# Patient Record
Sex: FEMALE | Race: WHITE | NOT HISPANIC OR LATINO | Employment: FULL TIME | URBAN - METROPOLITAN AREA
[De-identification: names, ages, dates, MRNs, and addresses within clinical notes are randomized per-mention and may not be internally consistent; named-entity substitution may affect disease eponyms.]

---

## 2018-10-20 ENCOUNTER — HOSPITAL ENCOUNTER (EMERGENCY)
Facility: CLINIC | Age: 38
Discharge: HOME | End: 2018-10-20
Attending: EMERGENCY MEDICINE | Admitting: EMERGENCY MEDICINE

## 2018-10-20 VITALS
SYSTOLIC BLOOD PRESSURE: 142 MMHG | DIASTOLIC BLOOD PRESSURE: 110 MMHG | HEIGHT: 62 IN | BODY MASS INDEX: 21.16 KG/M2 | RESPIRATION RATE: 18 BRPM | OXYGEN SATURATION: 99 % | HEART RATE: 99 BPM | TEMPERATURE: 98.2 F | WEIGHT: 115 LBS

## 2018-10-20 DIAGNOSIS — R44.0 AUDITORY HALLUCINATIONS: ICD-10-CM

## 2018-10-20 PROCEDURE — 25000132 ZZH RX MED GY IP 250 OP 250 PS 637: Performed by: EMERGENCY MEDICINE

## 2018-10-20 PROCEDURE — 99283 EMERGENCY DEPT VISIT LOW MDM: CPT

## 2018-10-20 RX ORDER — OLANZAPINE 5 MG/1
5 TABLET, ORALLY DISINTEGRATING ORAL ONCE
Status: COMPLETED | OUTPATIENT
Start: 2018-10-20 | End: 2018-10-20

## 2018-10-20 RX ORDER — LORAZEPAM 1 MG/1
1 TABLET ORAL ONCE
Status: COMPLETED | OUTPATIENT
Start: 2018-10-20 | End: 2018-10-20

## 2018-10-20 RX ADMIN — OLANZAPINE 5 MG: 5 TABLET, ORALLY DISINTEGRATING ORAL at 11:51

## 2018-10-20 RX ADMIN — LORAZEPAM 1 MG: 1 TABLET ORAL at 12:17

## 2018-10-20 ASSESSMENT — ENCOUNTER SYMPTOMS
SHORTNESS OF BREATH: 0
NECK PAIN: 0
HALLUCINATIONS: 1
FEVER: 0
BACK PAIN: 0
COLOR CHANGE: 0
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
## 2018-10-20 NOTE — ED AVS SNAPSHOT
[facility] Emergency Department    [address]    [facility] [address]    Phone:  [phone]    Fax:  [phone]                                       Cat Lake   MRN: [id number]    Department:  [facility] Emergency Department   Date of Visit:  10/20/2018           After Visit Summary Signature Page     I have received my discharge instructions, and my questions have been answered. I have discussed any challenges I see with this plan with the nurse or doctor.    ..........................................................................................................................................  Patient/Patient Representative Signature      ..........................................................................................................................................  Patient Representative Print Name and Relationship to Patient    ..................................................               ................................................  Date                                   Time    ..........................................................................................................................................  Reviewed by Signature/Title    ...................................................              ..............................................  Date                                               Time          22EPIC Rev 08/18

## 2018-10-20 NOTE — ED TRIAGE NOTES
38-year-old female presents to the ER with complaints of hallucinations. Pt states she does IV meth and this started two days after after she took the meth. Would like the things she is hearing to go away. She is not wanting to harm herself or others. Things she is hearing is not telling her to harm herself. Pt last used meth this am.

## 2018-10-20 NOTE — ED AVS SNAPSHOT
[facility] Emergency Department    [address]    [facility] [address]    Phone:  [phone]    Fax:  [phone]                                       Cat Lake   MRN: [id number]    Department:  [facility] [place] Emergency Department   Date of Visit:  10/20/2018           Patient Information     Date Of Birth          1980        Your diagnoses for this visit were:     Auditory hallucinations        You were seen by Octavio Jerez MD.      Follow-up Information     Follow up with [facility].    Specialty:  EMERGENCY MEDICINE    Why:  As needed, If symptoms worsen    Contact information:    [address]  [facility] [address] [phone]        Follow up with Clinic, [facility]. Schedule an appointment as soon as possible for a visit in 2 days.    Contact information:    [address] [facility], [facility] [address]  [place] [address]  [phone]          Discharge Instructions       You came to the ER with hallucinations.  Please return if you develop thoughts of hurting herself or others.  Please follow-up with your primary care provider to discuss ongoing hallucinations or resources for quitting meth.  Return for other new or concerning symptoms.    24 Hour Appointment Hotline       To make an appointment at any [facility] clinic, call [phone] ([phone]). If you don't have a family doctor or clinic, we will help you find one. [facility] clinics are conveniently located to serve the needs of you and your family.             Review of your medicines      Our records show that you are taking the medicines listed below. If these are incorrect, please call your family doctor or clinic.        Dose / Directions Last dose taken    FLUoxetine 20 MG tablet   Dose:  20 mg   Quantity:  30 tablet        Take 1 tablet (20 mg) by mouth daily   Refills:  0        hydrOXYzine 25 MG tablet   Commonly known as:  ATARAX   Dose:  25  mg   Quantity:  60 tablet        Take 1 tablet (25 mg) by mouth every 4 hours as needed for itching or anxiety (Hold if patient is sedated)   Refills:  0        NONFORMULARY        Patient has Implanted Birth Control in left arm   Refills:  0                Orders Needing Specimen Collection     None      Pending Results     No orders found from 10/18/2018 to 10/21/2018.            Pending Culture Results     No orders found from 10/18/2018 to 10/21/2018.            Pending Results Instructions     If you had any lab results that were not finalized at the time of your Discharge, you can call the ED Lab Result RN at [phone]. You will be contacted by this team for any positive Lab results or changes in treatment. The nurses are available 7 days a week from 10A to 6:30P.  You can leave a message 24 hours per day and they will return your call.        Test Results From Your Hospital Stay               Clinical Quality Measure: Blood Pressure Screening     Your blood pressure was checked while you were in the emergency department today. The last reading we obtained was  BP: (!) 155/109 . Please read the guidelines below about what these numbers mean and what you should do about them.  If your systolic blood pressure (the top number) is less than 120 and your diastolic blood pressure (the bottom number) is less than 80, then your blood pressure is normal. There is nothing more that you need to do about it.  If your systolic blood pressure (the top number) is 120-139 or your diastolic blood pressure (the bottom number) is 80-89, your blood pressure may be higher than it should be. You should have your blood pressure rechecked within a year by a primary care provider.  If your systolic blood pressure (the top number) is 140 or greater or your diastolic blood pressure (the bottom number) is 90 or greater, you may have high blood pressure. High blood pressure is treatable, but if left untreated over time it can put you  "at risk for heart attack, stroke, or kidney failure. You should have your blood pressure rechecked by a primary care provider within the next 4 weeks.  If your provider in the emergency department today gave you specific instructions to follow-up with your doctor or provider even sooner than that, you should follow that instruction and not wait for up to 4 weeks for your follow-up visit.        Thank you for choosing [facility]       Thank you for choosing [facility] for your care. Our goal is always to provide you with excellent care. Hearing back from our patients is one way we can continue to improve our services. Please take a few minutes to complete the written survey that you may receive in the mail after you visit with us. Thank you!        [employer] Information     [employer] lets you send messages to your doctor, view your test results, renew your prescriptions, schedule appointments and more. To sign up, go to [email or web address] . Click on \"Log in\" on the left side of the screen, which will take you to the Welcome page. Then click on \"Sign up Now\" on the right side of the page.     You will be asked to enter the access code listed below, as well as some personal information. Please follow the directions to create your username and password.     Your access code is: [email or web address]  Expires: 2019  1:21 PM     Your access code will  in 90 days. If you need help or a new code, please call your [facility] clinic or [phone].        Care EveryWhere ID     This is your Care EveryWhere ID. This could be used by other organizations to access your [facility] medical records  [id number]        Equal Access to Services     [facility] HospitalHELEN : Hadii pa quiroga Socolleen, waaxda luqadaha, qaybta kaalmadustin bello . So [facility] [phone].    ATENCIÓN: Si habla español, tiene a larose disposición servicios gratuitos de asistencia lingüística. Llame al [phone].    We comply " with applicable federal civil rights laws and [place] laws. We do not discriminate on the basis of race, color, national origin, age, disability, sex, sexual orientation, or gender identity.            After Visit Summary       This is your record. Keep this with you and show to your community pharmacist(s) and doctor(s) at your next visit.

## 2018-10-20 NOTE — ED PROVIDER NOTES
History     Chief Complaint:  Hallucinations    HPI   Cat Lake is a 38 year old female with a history of mental health problems and methadone abuse who presents to the emergency department today for evaluation of hallucinations. The patient states that she has been having auditory hallucinations for the past few days. She was sober for a year and a half, but last month relapsed and has been using again. She states she injects into her right arm, but has no redness to the area. She states that the voices don't tell her to do anything and that she does no have suicidal or homicidal ideations. She denies any alcohol use. She further denies any fever, neck or back pain, and trouble breathing.     Allergies:  No Known Drug Allergies    Medications:    FLUoxetine 20 MG tablet  hydrOXYzine (ATARAX) 25 MG tablet  NONFORMULARY    Past Medical History:    Deliberate self-cutting   Lumbar contusion   Methadone misuse   MRSA   Postpartum depression     Past Surgical History:    History reviewed. No pertinent surgical history.    Family History:    Depression  Father   Anxiety Disorder Father   Bipolar Disorder Father   Substance Abuse Father      Social History:  The patient was accompanied to the ED by nobody.  Smoking Status: Current Everyday Smoker   Smokeless Tobacco: Never Used  Alcohol Use: Negative   Marital Status:  Single     Review of Systems   Constitutional: Negative for fever.   Respiratory: Negative for shortness of breath.    Musculoskeletal: Negative for back pain and neck pain.   Skin: Negative for color change.   Psychiatric/Behavioral: Positive for hallucinations. Negative for suicidal ideas.     Physical Exam     Patient Vitals for the past 24 hrs:   BP Temp Temp src Pulse Resp SpO2 Height Weight   10/20/18 1315 (!) 142/110 - - - - 99 % - -   10/20/18 1300 (!) 148/105 - - - - 100 % - -   10/20/18 1245 (!) 152/104 - - - - 90 % - -   10/20/18 1230 (!) 146/106 - - - - 100 % - -   10/20/18 1215 (!)  "147/101 - - - - 100 % - -   10/20/18 1045 (!) 155/109 98.2  F (36.8  C) Temporal 99 18 99 % 1.575 m (5' 2\") 52.2 kg (115 lb)      Physical Exam    VS: Reviewed per above  HENT: Mucous membranes moist  EYES: conjunctiva anicteric  CV: Rate as noted, regular rhythm.   RESP: Effort normal. Breath sounds are normal bilaterally.  GI: no tenderness, not distended.  NEURO: Alert, moving all extremities  PSYCH:    Appearance: adequately groomed   Attitude:  cooperative   Eye Contact:  good   Mood:  good   Affect:  Normal range and mood congruent   Speech:  Clear but pressured, coherent   Psychomotor Behavior:  restless   Thought Process:  logical, linear   Thought Content:  no evidence of suicidal ideation or homicidal ideation, + auditory hallucinations present   Insight:  good   Judgment:  intact    Emergency Department Course     Interventions:  1151 Zyprexa 5 mg PO  1217 Ativan 1 mg PO    Emergency Department Course:    1050 Nursing notes and vitals reviewed.    1055 I performed an exam of the patient as documented above.     1205 Patient was rechecked and updated.     1300 Patient was rechecked and updated.    1433 I personally reviewed the results with the patient and answered all related questions prior to discharge.    Impression & Plan      Medical Decision Making:  Cat Lake is a 38 year old female who presents to the emergency department today for evaluation of auditory hallucinations.  Initial vital signs are within normal limits.  She has a history of auditory hallucinations with methamphetamine use.  She recently used methamphetamines and I suspect they are secondary to this.  The hallucinations are not commanding her to do anything and she is not having suicidal ideation or homicidal ideation.  They are more so bothersome to her.  She does have some restlessness and pressured speech but has good insight into her condition and is quite cooperative.  I suspect these hallucinations are secondary to " methamphetamine use.  She has no fever or nuchal rigidity to suggest meningitis or encephalitis.  She is given Zyprexa and Ativan with some improvement.  Family member was able to pick her up.  She will follow-up in clinic next week.  Close return precautions were discussed.    Diagnosis:    ICD-10-CM    1. Auditory hallucinations R44.0      Disposition:   Discharge    Scribe Disclosure:  Chad BEAVER, am serving as a scribe at 11:01 AM on 10/20/2018 to document services personally performed by Octavio Jerez MD based on my observations and the provider's statements to me.     [facility] EMERGENCY DEPARTMENT       Octavio Jerez MD  10/20/18 5400

## 2018-10-20 NOTE — LETTER
October 21, 2018      To Whom It May Concern:      Cat Lake was seen in our Emergency Department today, 10/21/18.  I expect her condition to improve over the next two days.  She may return to work/school when improved.    Sincerely,        Jalyn Saldivar RN

## 2018-10-20 NOTE — DISCHARGE INSTRUCTIONS
You came to the ER with hallucinations.  Please return if you develop thoughts of hurting herself or others.  Please follow-up with your primary care provider to discuss ongoing hallucinations or resources for quitting meth.  Return for other new or concerning symptoms.

## 2019-07-10 ENCOUNTER — HOSPITAL ENCOUNTER (EMERGENCY)
Facility: CLINIC | Age: 39
Discharge: HOME | End: 2019-07-10
Attending: EMERGENCY MEDICINE | Admitting: EMERGENCY MEDICINE

## 2019-07-10 VITALS
RESPIRATION RATE: 18 BRPM | HEART RATE: 83 BPM | SYSTOLIC BLOOD PRESSURE: 146 MMHG | TEMPERATURE: 98.5 F | DIASTOLIC BLOOD PRESSURE: 111 MMHG | OXYGEN SATURATION: 99 %

## 2019-07-10 DIAGNOSIS — F15.10 METHAMPHETAMINE ABUSE (H): ICD-10-CM

## 2019-07-10 DIAGNOSIS — L08.9 SKIN PUSTULE: ICD-10-CM

## 2019-07-10 DIAGNOSIS — Z59.00 HOMELESSNESS: ICD-10-CM

## 2019-07-10 LAB
ALBUMIN UR-MCNC: 10 MG/DL
AMPHETAMINES UR QL SCN: POSITIVE
APPEARANCE UR: ABNORMAL
BACTERIA #/AREA URNS HPF: ABNORMAL /HPF
BARBITURATES UR QL: NEGATIVE
BENZODIAZ UR QL: NEGATIVE
BILIRUB UR QL STRIP: NEGATIVE
CANNABINOIDS UR QL SCN: NEGATIVE
COCAINE UR QL: NEGATIVE
COLOR UR AUTO: YELLOW
GLUCOSE UR STRIP-MCNC: NEGATIVE MG/DL
HCG UR QL: NEGATIVE
HGB UR QL STRIP: NEGATIVE
KETONES UR STRIP-MCNC: NEGATIVE MG/DL
LEUKOCYTE ESTERASE UR QL STRIP: ABNORMAL
MUCOUS THREADS #/AREA URNS LPF: PRESENT /LPF
NITRATE UR QL: NEGATIVE
OPIATES UR QL SCN: NEGATIVE
PCP UR QL SCN: NEGATIVE
PH UR STRIP: 5.5 PH (ref 5–7)
RBC #/AREA URNS AUTO: 1 /HPF (ref 0–2)
SOURCE: ABNORMAL
SP GR UR STRIP: 1.03 (ref 1–1.03)
SQUAMOUS #/AREA URNS AUTO: 13 /HPF (ref 0–1)
UROBILINOGEN UR STRIP-MCNC: NORMAL MG/DL (ref 0–2)
WBC #/AREA URNS AUTO: 6 /HPF (ref 0–5)

## 2019-07-10 PROCEDURE — 80307 DRUG TEST PRSMV CHEM ANLYZR: CPT | Performed by: EMERGENCY MEDICINE

## 2019-07-10 PROCEDURE — 99285 EMERGENCY DEPT VISIT HI MDM: CPT | Mod: 25

## 2019-07-10 PROCEDURE — 90791 PSYCH DIAGNOSTIC EVALUATION: CPT

## 2019-07-10 PROCEDURE — 81001 URINALYSIS AUTO W/SCOPE: CPT | Performed by: EMERGENCY MEDICINE

## 2019-07-10 PROCEDURE — 81025 URINE PREGNANCY TEST: CPT | Performed by: EMERGENCY MEDICINE

## 2019-07-10 PROCEDURE — 99283 EMERGENCY DEPT VISIT LOW MDM: CPT

## 2019-07-10 SDOH — ECONOMIC STABILITY - HOUSING INSECURITY: HOMELESSNESS UNSPECIFIED: Z59.00

## 2019-07-10 ASSESSMENT — ENCOUNTER SYMPTOMS
DIARRHEA: 0
DYSURIA: 0
HEADACHES: 0
CONSTIPATION: 0
SHORTNESS OF BREATH: 0
FATIGUE: 0
NUMBNESS: 1
BACK PAIN: 1
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
## 2019-07-10 NOTE — ED AVS SNAPSHOT
[facility] Emergency Department  [address]  [facility] [address]  Phone:  [phone]  Fax:  [phone]                                    Cat Lake   MRN: [id number]    Department:  [facility] Emergency Department   Date of Visit:  7/10/2019           After Visit Summary Signature Page    I have received my discharge instructions, and my questions have been answered. I have discussed any challenges I see with this plan with the nurse or doctor.    ..........................................................................................................................................  Patient/Patient Representative Signature      ..........................................................................................................................................  Patient Representative Print Name and Relationship to Patient    ..................................................               ................................................  Date                                   Time    ..........................................................................................................................................  Reviewed by Signature/Title    ...................................................              ..............................................  Date                                               Time          22EPIC Rev 08/18
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
## 2019-08-18 ENCOUNTER — HOSPITAL ENCOUNTER (EMERGENCY)
Facility: CLINIC | Age: 39
Discharge: HOME | End: 2019-08-18

## 2019-08-18 VITALS
RESPIRATION RATE: 16 BRPM | OXYGEN SATURATION: 100 % | HEART RATE: 93 BPM | WEIGHT: 132 LBS | BODY MASS INDEX: 24.14 KG/M2 | TEMPERATURE: 97 F

## 2019-08-19 ENCOUNTER — HOSPITAL ENCOUNTER (EMERGENCY)
Facility: CLINIC | Age: 39
End: 2019-08-19

## 2019-08-19 NOTE — ED TRIAGE NOTES
Mother/brother reports pt does meth and has been hallucinating, seeing spiders in her car and wants detox for her.  Pt denies mental health problems, states that mother does not want her in her house and she is homeless. Requesting for shelter information and left the ED before writer could talk to her. Brother stated that pt was going to smoke but she never came back.

## 2019-09-04 ENCOUNTER — HOSPITAL ENCOUNTER (EMERGENCY)
Facility: CLINIC | Age: 39
Discharge: HOME | End: 2019-09-05
Attending: EMERGENCY MEDICINE | Admitting: EMERGENCY MEDICINE
Payer: MEDICAID

## 2019-09-04 DIAGNOSIS — R10.84 ABDOMINAL PAIN, GENERALIZED: ICD-10-CM

## 2019-09-04 LAB
BASOPHILS # BLD AUTO: 0.1 10E9/L (ref 0–0.2)
BASOPHILS NFR BLD AUTO: 0.6 %
DIFFERENTIAL METHOD BLD: NORMAL
EOSINOPHIL # BLD AUTO: 0.2 10E9/L (ref 0–0.7)
EOSINOPHIL NFR BLD AUTO: 2.7 %
ERYTHROCYTE [DISTWIDTH] IN BLOOD BY AUTOMATED COUNT: 14.5 % (ref 10–15)
HCT VFR BLD AUTO: 40.5 % (ref 35–47)
HGB BLD-MCNC: 12.8 G/DL (ref 11.7–15.7)
IMM GRANULOCYTES # BLD: 0 10E9/L (ref 0–0.4)
IMM GRANULOCYTES NFR BLD: 0.4 %
LYMPHOCYTES # BLD AUTO: 2.1 10E9/L (ref 0.8–5.3)
LYMPHOCYTES NFR BLD AUTO: 25.8 %
MCH RBC QN AUTO: 28.1 PG (ref 26.5–33)
MCHC RBC AUTO-ENTMCNC: 31.6 G/DL (ref 31.5–36.5)
MCV RBC AUTO: 89 FL (ref 78–100)
MONOCYTES # BLD AUTO: 0.7 10E9/L (ref 0–1.3)
MONOCYTES NFR BLD AUTO: 8.6 %
NEUTROPHILS # BLD AUTO: 5 10E9/L (ref 1.6–8.3)
NEUTROPHILS NFR BLD AUTO: 61.9 %
NRBC # BLD AUTO: 0 10*3/UL
NRBC BLD AUTO-RTO: 0 /100
PLATELET # BLD AUTO: 262 10E9/L (ref 150–450)
RBC # BLD AUTO: 4.55 10E12/L (ref 3.8–5.2)
WBC # BLD AUTO: 8.1 10E9/L (ref 4–11)

## 2019-09-04 PROCEDURE — 80053 COMPREHEN METABOLIC PANEL: CPT | Performed by: EMERGENCY MEDICINE

## 2019-09-04 PROCEDURE — 25000132 ZZH RX MED GY IP 250 OP 250 PS 637: Performed by: EMERGENCY MEDICINE

## 2019-09-04 PROCEDURE — 99285 EMERGENCY DEPT VISIT HI MDM: CPT | Mod: 25

## 2019-09-04 PROCEDURE — 25000125 ZZHC RX 250: Performed by: EMERGENCY MEDICINE

## 2019-09-04 PROCEDURE — 84703 CHORIONIC GONADOTROPIN ASSAY: CPT | Performed by: EMERGENCY MEDICINE

## 2019-09-04 PROCEDURE — 85025 COMPLETE CBC W/AUTO DIFF WBC: CPT | Performed by: EMERGENCY MEDICINE

## 2019-09-04 PROCEDURE — 83690 ASSAY OF LIPASE: CPT | Performed by: EMERGENCY MEDICINE

## 2019-09-04 PROCEDURE — 96374 THER/PROPH/DIAG INJ IV PUSH: CPT

## 2019-09-04 RX ADMIN — LIDOCAINE HYDROCHLORIDE 30 ML: 20 SOLUTION ORAL; TOPICAL at 23:41

## 2019-09-04 RX ADMIN — FAMOTIDINE 20 MG: 10 INJECTION INTRAVENOUS at 23:40

## 2019-09-04 ASSESSMENT — ENCOUNTER SYMPTOMS
DIARRHEA: 0
ABDOMINAL PAIN: 1
ABDOMINAL DISTENTION: 1
CONSTIPATION: 0
APPETITE CHANGE: 1

## 2019-09-04 NOTE — ED AVS SNAPSHOT
[facility] Emergency Department  [address]  [facility] [address]  Phone:  [phone]  Fax:  [phone]                                    Cat Lake   MRN: [id number]    Department:  [facility] Emergency Department   Date of Visit:  9/4/2019           After Visit Summary Signature Page    I have received my discharge instructions, and my questions have been answered. I have discussed any challenges I see with this plan with the nurse or doctor.    ..........................................................................................................................................  Patient/Patient Representative Signature      ..........................................................................................................................................  Patient Representative Print Name and Relationship to Patient    ..................................................               ................................................  Date                                   Time    ..........................................................................................................................................  Reviewed by Signature/Title    ...................................................              ..............................................  Date                                               Time          22EPIC Rev 08/18

## 2019-09-05 ENCOUNTER — APPOINTMENT (OUTPATIENT)
Dept: ULTRASOUND IMAGING | Facility: CLINIC | Age: 39
End: 2019-09-05
Attending: EMERGENCY MEDICINE
Payer: MEDICAID

## 2019-09-05 VITALS
HEART RATE: 67 BPM | BODY MASS INDEX: 25.61 KG/M2 | SYSTOLIC BLOOD PRESSURE: 115 MMHG | OXYGEN SATURATION: 98 % | TEMPERATURE: 98.1 F | DIASTOLIC BLOOD PRESSURE: 70 MMHG | WEIGHT: 140 LBS | RESPIRATION RATE: 16 BRPM

## 2019-09-05 LAB
ALBUMIN SERPL-MCNC: 3.9 G/DL (ref 3.4–5)
ALBUMIN UR-MCNC: 20 MG/DL
ALP SERPL-CCNC: 70 U/L (ref 40–150)
ALT SERPL W P-5'-P-CCNC: 19 U/L (ref 0–50)
ANION GAP SERPL CALCULATED.3IONS-SCNC: 3 MMOL/L (ref 3–14)
APPEARANCE UR: CLEAR
AST SERPL W P-5'-P-CCNC: 20 U/L (ref 0–45)
BACTERIA #/AREA URNS HPF: ABNORMAL /HPF
BILIRUB SERPL-MCNC: 0.3 MG/DL (ref 0.2–1.3)
BILIRUB UR QL STRIP: NEGATIVE
BUN SERPL-MCNC: 13 MG/DL (ref 7–30)
CALCIUM SERPL-MCNC: 8.9 MG/DL (ref 8.5–10.1)
CHLORIDE SERPL-SCNC: 107 MMOL/L (ref 94–109)
CO2 SERPL-SCNC: 28 MMOL/L (ref 20–32)
COLOR UR AUTO: YELLOW
CREAT SERPL-MCNC: 0.83 MG/DL (ref 0.52–1.04)
GFR SERPL CREATININE-BSD FRML MDRD: 88 ML/MIN/{1.73_M2}
GLUCOSE SERPL-MCNC: 104 MG/DL (ref 70–99)
GLUCOSE UR STRIP-MCNC: NEGATIVE MG/DL
HCG SERPL QL: NEGATIVE
HGB UR QL STRIP: NEGATIVE
KETONES UR STRIP-MCNC: ABNORMAL MG/DL
LEUKOCYTE ESTERASE UR QL STRIP: NEGATIVE
LIPASE SERPL-CCNC: 148 U/L (ref 73–393)
MUCOUS THREADS #/AREA URNS LPF: PRESENT /LPF
NITRATE UR QL: NEGATIVE
PH UR STRIP: 5.5 PH (ref 5–7)
POTASSIUM SERPL-SCNC: 3.2 MMOL/L (ref 3.4–5.3)
PROT SERPL-MCNC: 7.2 G/DL (ref 6.8–8.8)
RBC #/AREA URNS AUTO: 3 /HPF (ref 0–2)
SODIUM SERPL-SCNC: 138 MMOL/L (ref 133–144)
SOURCE: ABNORMAL
SP GR UR STRIP: 1.03 (ref 1–1.03)
SQUAMOUS #/AREA URNS AUTO: 5 /HPF (ref 0–1)
UROBILINOGEN UR STRIP-MCNC: NORMAL MG/DL (ref 0–2)
WBC #/AREA URNS AUTO: 4 /HPF (ref 0–5)

## 2019-09-05 PROCEDURE — 81001 URINALYSIS AUTO W/SCOPE: CPT | Performed by: EMERGENCY MEDICINE

## 2019-09-05 PROCEDURE — 93976 VASCULAR STUDY: CPT
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
## 2019-09-05 NOTE — ED NOTES
Patient ambulated to the restroom to give UA sample. Patient stable on feet, when back to room patient started eating her crackers and drinking juice. No further complaints of abdomen pain

## 2019-10-22 ENCOUNTER — HOSPITAL ENCOUNTER (EMERGENCY)
Facility: CLINIC | Age: 39
Discharge: HOME | End: 2019-10-23
Attending: EMERGENCY MEDICINE | Admitting: EMERGENCY MEDICINE
Payer: MEDICAID

## 2019-10-22 DIAGNOSIS — R51.9 NONINTRACTABLE HEADACHE, UNSPECIFIED CHRONICITY PATTERN, UNSPECIFIED HEADACHE TYPE: ICD-10-CM

## 2019-10-22 PROCEDURE — 99285 EMERGENCY DEPT VISIT HI MDM: CPT | Mod: 25

## 2019-10-22 RX ORDER — SODIUM CHLORIDE 9 MG/ML
1000 INJECTION, SOLUTION INTRAVENOUS CONTINUOUS
Status: DISCONTINUED | OUTPATIENT
Start: 2019-10-22 | End: 2019-10-23 | Stop reason: HOSPADM

## 2019-10-22 RX ORDER — LORAZEPAM 2 MG/ML
0.5 INJECTION INTRAMUSCULAR ONCE
Status: COMPLETED | OUTPATIENT
Start: 2019-10-22 | End: 2019-10-23

## 2019-10-22 RX ORDER — ONDANSETRON 2 MG/ML
4 INJECTION INTRAMUSCULAR; INTRAVENOUS ONCE
Status: COMPLETED | OUTPATIENT
Start: 2019-10-22 | End: 2019-10-23

## 2019-10-22 ASSESSMENT — ENCOUNTER SYMPTOMS
NAUSEA: 0
VOMITING: 0
NECK PAIN: 0
HEADACHES: 1
FEVER: 0

## 2019-10-22 NOTE — ED AVS SNAPSHOT
[facility] Emergency Department  [address]  [facility] [address]  Phone:  [phone]  Fax:  [phone]                                    Cat Lake   MRN: [id number]    Department:  [facility] Emergency Department   Date of Visit:  10/22/2019           After Visit Summary Signature Page    I have received my discharge instructions, and my questions have been answered. I have discussed any challenges I see with this plan with the nurse or doctor.    ..........................................................................................................................................  Patient/Patient Representative Signature      ..........................................................................................................................................  Patient Representative Print Name and Relationship to Patient    ..................................................               ................................................  Date                                   Time    ..........................................................................................................................................  Reviewed by Signature/Title    ...................................................              ..............................................  Date                                               Time          22EPIC Rev 08/18

## 2019-10-23 ENCOUNTER — APPOINTMENT (OUTPATIENT)
Dept: CT IMAGING | Facility: CLINIC | Age: 39
End: 2019-10-23
Attending: EMERGENCY MEDICINE
Payer: MEDICAID

## 2019-10-23 VITALS
HEART RATE: 76 BPM | DIASTOLIC BLOOD PRESSURE: 83 MMHG | TEMPERATURE: 98 F | BODY MASS INDEX: 23.78 KG/M2 | SYSTOLIC BLOOD PRESSURE: 128 MMHG | WEIGHT: 130 LBS | RESPIRATION RATE: 18 BRPM | OXYGEN SATURATION: 99 %

## 2019-10-23 PROCEDURE — 25000128 H RX IP 250 OP 636: Performed by: EMERGENCY MEDICINE

## 2019-10-23 PROCEDURE — 96375 TX/PRO/DX INJ NEW DRUG ADDON: CPT

## 2019-10-23 PROCEDURE — 96374 THER/PROPH/DIAG INJ IV PUSH: CPT

## 2019-10-23 PROCEDURE — 25800030 ZZH RX IP 258 OP 636: Performed by: EMERGENCY MEDICINE

## 2019-10-23 PROCEDURE — 70450 CT HEAD/BRAIN W/O DYE: CPT

## 2019-10-23 PROCEDURE — 96361 HYDRATE IV INFUSION ADD-ON: CPT

## 2019-10-23 RX ADMIN — LORAZEPAM 0.5 MG: 2 INJECTION INTRAMUSCULAR; INTRAVENOUS at 00:04

## 2019-10-23 RX ADMIN — ONDANSETRON HYDROCHLORIDE 4 MG: 2 INJECTION, SOLUTION INTRAMUSCULAR; INTRAVENOUS at 00:03

## 2019-10-23 RX ADMIN — SODIUM CHLORIDE 1000 ML: 9 INJECTION, SOLUTION INTRAVENOUS at 00:03

## 2019-10-23 NOTE — ED PROVIDER NOTES
History     Chief Complaint:  Headache     HPI   Cat Lake is a 39 year old female, with a history of alcohol abuse, opioid dependency, methadone abuse, overdose, and methamphetamine abuse, who presents to the ED for evaluation of a headache. The patient states that she injected what she thought was methamphetamine about one hour prior to presentation, and states that she immediately developed a left sided headache. She also reports seeing black spots in her left eye. The patient states that this is not as bad as her migraines, but she is concerned that her headache is only one-sided as her migraines are normally at the back of her head. The patient denies any nausea, vomiting, fevers, neck pain, hitting her head, or chance of pregnancy. The patient denies taking any pain medications.     Allergies:  The patient has no known drug allergies.    Medications:    The patient is not currently on any daily prescription medications.    Past Medical History:    Alcohol abuse  Carpal tunnel syndrome  Deliberate self-cutting  Postpartum depression  Lumbar contusion  Methadone misuse  Methamphetamine abuse  MRSA  Opioid dependence  Overdose  Altered mental status   Chemical dependency     Past Surgical History:    Colposcopy, biopsy, combined  Laparoscopic salpingectomy bilateral nos  Mouth surgery  ORIF hand, right    Family History:    Depression, father  Anxiety, father  Bipolar disorder, father  Substance abuse, father    Social History:  Current every day smoker, 1 pack per day.  Negative for alcohol use.  Methamphetamine abuse.  Marital Status:  Single [1]     Review of Systems   Constitutional: Negative for fever.   Eyes: Positive for visual disturbance.   Gastrointestinal: Negative for nausea and vomiting.   Musculoskeletal: Negative for neck pain.   Neurological: Positive for headaches.   All other systems reviewed and are negative.    Physical Exam     Patient Vitals for the past 24 hrs:   BP Temp Temp src  Pulse Heart Rate Resp SpO2 Weight   10/23/19 0036 (!) 138/99 -- -- 92 92 16 100 % --   10/22/19 2332 (!) 163/105 98  F (36.7  C) Temporal 95 95 18 99 % 59 kg (130 lb)     Physical Exam  General: Patient is alert and interactive when I enter the room  Head:  The scalp, face, and head appear normal  Eyes:  Conjunctivae are normal  ENT:    The nose is normal    Pinnae are normal    External acoustic canals are normal  Neck:  Trachea midline, good ROM  CV:  Pulses are normal .    Resp:  No respiratory distress   Abdomen:      Soft, non-tender, non-distended  Musc:  Normal muscular tone    No major joint effusions    No asymmetric leg swelling    Good capillary refill noted  Skin:  No rash or lesions noted  Neuro: Mildly pressured speech. Face is symmetric.     Moving all extremities well.   Psych: Awake. Alert.  Normal affect.  Appropriate interactions.    Emergency Department Course   Imaging:  Radiographic findings were communicated with the patient who voiced understanding of the findings.  CT Head without contrast:   No acute intracranial process as per radiology.    Interventions:  0003 NS 1L IV Bolus   Zofran injection 4 mg IV  0004 Ativan injection 0.5 mg IV    Emergency Department Course:  Nursing notes and vitals reviewed. (3450) I performed an exam of the patient as documented above.     IV inserted. Medicine administered as documented above. Blood drawn. This was sent to the lab for further testing, results above.     The patient was sent for a head CT while in the emergency department, findings above.     0156 I rechecked the patient and discussed the results of her workup thus far.     Findings and plan explained to the Patient. Patient discharged home with instructions regarding supportive care, medications, and reasons to return. The importance of close follow-up was reviewed.     Impression & Plan    Medical Decision Making:  Cat Lake is a 38 yo F with a of substance abuse presents with headache.   Patient was concerned that she was given a drug that she injected that was not meth which is what she intended it to be.  She was concerned because she got a headache right away.  Actually was not the worst headache of her life.  We did do a head CT which was unremarkable.  She denies any fevers.  She otherwise appears well.  The patient has signs of meth intoxication as she is concerned about lesions on her scalp and has having pressured speech.  Patient was given fluids Ativan and Zofran.  She then slept and appeared quite improved.  Her head CT was unremarkable.  I do not think there is anything else in terms of work-up for her.  This was likely a tension headache or secondary to drug use.  Patient felt comfortable going home.  Patient discharged.    Diagnosis:    ICD-10-CM   1. Nonintractable headache, unspecified chronicity pattern, unspecified headache type R51       Disposition:  The patient was discharged to home.    Scribe Disclosure:  I, Ariadne Alfred, am serving as a scribe on 10/22/2019 at 11:40 PM to personally document services performed by Lynn Olson MD based on my observations and the provider's statements to me.     Ariadne Alfred  10/22/2019   [facility] EMERGENCY DEPARTMENT       Lynn Olson MD  10/23/19 0546

## 2019-11-13 ENCOUNTER — HOSPITAL ENCOUNTER (EMERGENCY)
Facility: CLINIC | Age: 39
Discharge: HOME | End: 2019-11-13

## 2019-11-13 VITALS
HEIGHT: 62 IN | SYSTOLIC BLOOD PRESSURE: 105 MMHG | WEIGHT: 134.48 LBS | DIASTOLIC BLOOD PRESSURE: 91 MMHG | OXYGEN SATURATION: 100 % | RESPIRATION RATE: 22 BRPM | BODY MASS INDEX: 24.75 KG/M2 | TEMPERATURE: 97.9 F

## 2019-11-13 ASSESSMENT — MIFFLIN-ST. JEOR: SCORE: 1238.25
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
## 2020-01-04 ENCOUNTER — HOSPITAL ENCOUNTER (EMERGENCY)
Facility: CLINIC | Age: 40
Discharge: HOME | End: 2020-01-04
Attending: PHYSICIAN ASSISTANT | Admitting: PHYSICIAN ASSISTANT
Payer: MEDICAID

## 2020-01-04 VITALS
BODY MASS INDEX: 25.27 KG/M2 | TEMPERATURE: 97.7 F | OXYGEN SATURATION: 100 % | HEIGHT: 62 IN | HEART RATE: 90 BPM | WEIGHT: 137.35 LBS | DIASTOLIC BLOOD PRESSURE: 99 MMHG | SYSTOLIC BLOOD PRESSURE: 142 MMHG

## 2020-01-04 DIAGNOSIS — J32.9 SINUSITIS: ICD-10-CM

## 2020-01-04 DIAGNOSIS — R51.9 ACUTE NONINTRACTABLE HEADACHE, UNSPECIFIED HEADACHE TYPE: ICD-10-CM

## 2020-01-04 PROCEDURE — 25000128 H RX IP 250 OP 636: Performed by: PHYSICIAN ASSISTANT

## 2020-01-04 PROCEDURE — 96361 HYDRATE IV INFUSION ADD-ON: CPT

## 2020-01-04 PROCEDURE — 96375 TX/PRO/DX INJ NEW DRUG ADDON: CPT

## 2020-01-04 PROCEDURE — 99284 EMERGENCY DEPT VISIT MOD MDM: CPT | Mod: 25

## 2020-01-04 PROCEDURE — 25800030 ZZH RX IP 258 OP 636: Performed by: PHYSICIAN ASSISTANT

## 2020-01-04 PROCEDURE — 96374 THER/PROPH/DIAG INJ IV PUSH: CPT

## 2020-01-04 PROCEDURE — 25000125 ZZHC RX 250: Performed by: PHYSICIAN ASSISTANT

## 2020-01-04 RX ORDER — OXYMETAZOLINE HYDROCHLORIDE 0.05 G/100ML
2 SPRAY NASAL ONCE
Status: COMPLETED | OUTPATIENT
Start: 2020-01-04 | End: 2020-01-04

## 2020-01-04 RX ORDER — OLANZAPINE 10 MG/2ML
2.5 INJECTION, POWDER, FOR SOLUTION INTRAMUSCULAR DAILY PRN
Status: DISCONTINUED | OUTPATIENT
Start: 2020-01-04 | End: 2020-01-04 | Stop reason: HOSPADM

## 2020-01-04 RX ORDER — KETOROLAC TROMETHAMINE 15 MG/ML
15 INJECTION, SOLUTION INTRAMUSCULAR; INTRAVENOUS ONCE
Status: COMPLETED | OUTPATIENT
Start: 2020-01-04 | End: 2020-01-04

## 2020-01-04 RX ADMIN — KETOROLAC TROMETHAMINE 15 MG: 15 INJECTION, SOLUTION INTRAMUSCULAR; INTRAVENOUS at 15:22

## 2020-01-04 RX ADMIN — OLANZAPINE 2.5 MG: 10 INJECTION, POWDER, FOR SOLUTION INTRAMUSCULAR at 15:13

## 2020-01-04 RX ADMIN — SODIUM CHLORIDE 1000 ML: 9 INJECTION, SOLUTION INTRAVENOUS at 15:22

## 2020-01-04 RX ADMIN — Medication 2 SPRAY: at 15:22

## 2020-01-04 ASSESSMENT — MIFFLIN-ST. JEOR: SCORE: 1251.25

## 2020-01-04 ASSESSMENT — ENCOUNTER SYMPTOMS
SINUS PRESSURE: 1
HEADACHES: 1
SINUS PAIN: 1
SORE THROAT: 0

## 2020-01-04 NOTE — DISCHARGE INSTRUCTIONS
Discharge Instructions  Sinus Infection    You have acute sinusitis, or an infection of the sinuses. The sinuses are the hollow areas within the facial bones that are connected to the nasal opening. The most common cause of acute sinusitis is a virus infection associated with the common cold. Bacterial sinusitis occurs much less commonly, usually as a complication of viral sinusitis. Experts say that most sinusitis is caused by a virus within the first 7-10 days of illness. Antibiotics do nothing to help with virus infections, so most people do not need antibiotics for acute sinusitis.     Generally, every Emergency Department visit should have a follow-up clinic visit with either a primary or a specialty clinic/provider. Please follow-up as instructed by your emergency provider today.    Return to the Emergency Department if:  Your vision changes.  You are confused or have difficulty thinking clearly.  You have swelling around your eye.  You develop a severe headache or neck stiffness.  Your symptoms get worse and you are unable to see your primary provider.      Treatment:  Pain relief -- Non-prescription pain medications, such as Tylenol  (acetaminophen) or Motrin  or Advil  (ibuprofen) are recommended for pain.  Do not use a medicine that you are allergic to, or if your provider has told you not to use it.     Nasal irrigation -- Flushing the nose and sinuses with a saline solution several times per day can help to decrease pain caused by congestion.  Nasal decongestants -- Nasal decongestant sprays, including Afrin  (oxymetazoline) and Walter-Synephrine  (phenylephrine) can be used to temporarily treat congestion. However, these sprays should not be used for more than two to three days due to the risk of rebound congestion (when the nose is congested constantly unless the medication is used repeatedly).  Nasal glucocorticoids -- These are prescription steroids delivered by a nasal spray that can help to reduce  swelling inside the nose, usually within two to three days. These drugs have few side effects and dramatically relieve symptoms in most people.  If you use these in conjunction with Afrin  you will need to use at least 15 minutes prior to the nasal decongestant.    Antibiotic? -- Rarely antibiotics are used along with the above treatments.    If you were given a prescription for medicine here today, be sure to read all of the information (including the package insert) that comes with your prescription.  This will include important information about the medicine, its side effects, and any warnings that you need to know about.  The pharmacist who fills the prescription can provide more information and answer questions you may have about the medicine.  If you have questions or concerns that the pharmacist cannot address, please call or return to the Emergency Department.   Remember that you can always come back to the Emergency Department if you are not able to see your regular provider in the amount of time listed above, if you get any new symptoms, or if there is anything that worries you.    Discharge Instructions  Headache    You were seen today for a headache. Headaches may be caused by many different things such as muscle tension, sinus inflammation, anxiety and stress, having too little sleep, too much alcohol, some medical conditions or injury. You may have a migraine, which is caused by changes in the blood vessels in your head.  At this time your provider does not find that your headache is a sign of anything dangerous or life-threatening.  However, sometimes the signs of serious illness do not show up right away.      Generally, every Emergency Department visit should have a follow-up clinic visit with either a primary or a specialty clinic/provider. Please follow-up as instructed by your emergency provider today.    Return to the Emergency Department if:  You get a new fever of 100.4 F or higher.  Your  headache gets much worse.  You get a stiff neck with your headache.  You get a new headache that is significantly different or worse than headaches you have had before.  You are vomiting (throwing up) and cannot keep food or water down.  You have blurry or double vision or other problems with your eyes.  You have a new weakness on one side of your body.  You have difficulty with balance which is new.  You or your family thinks you are confused.  You have a seizure.    What can I do to help myself?  Pain medications - You may take a pain medication such as Tylenol  (acetaminophen), Advil , Motrin  (ibuprofen) or Aleve  (naproxen).  Take a pain reliever as soon as you notice symptoms.  Starting medications as soon as you start to have symptoms may lessen the amount of pain you have.  Relaxing in a quiet, dark room may help.  Get enough sleep and eat meals regularly.  You may need to watch for certain foods or other things which may trigger your headaches.  Keeping a journal of your headaches and possible triggers may help you and your primary provider to identify things which you should avoid which may be causing your headaches.  If you were given a prescription for medicine here today, be sure to read all of the information (including the package insert) that comes with your prescription.  This will include important information about the medicine, its side effects, and any warnings that you need to know about.  The pharmacist who fills the prescription can provide more information and answer questions you may have about the medicine.  If you have questions or concerns that the pharmacist cannot address, please call or return to the Emergency Department.   Remember that you can always come back to the Emergency Department if you are not able to see your regular provider in the amount of time listed above, if you get any new symptoms, or if there is anything that worries you.

## 2020-01-04 NOTE — ED AVS SNAPSHOT
[facility] Emergency Department  [address]  [facility] [address]  Phone:  [phone]  Fax:  [phone]                                    Cat Lake   MRN: [id number]    Department:  [facility] Emergency Department   Date of Visit:  1/4/2020           After Visit Summary Signature Page    I have received my discharge instructions, and my questions have been answered. I have discussed any challenges I see with this plan with the nurse or doctor.    ..........................................................................................................................................  Patient/Patient Representative Signature      ..........................................................................................................................................  Patient Representative Print Name and Relationship to Patient    ..................................................               ................................................  Date                                   Time    ..........................................................................................................................................  Reviewed by Signature/Title    ...................................................              ..............................................  Date                                               Time          22EPIC Rev 08/18

## 2020-01-04 NOTE — LETTER
January 4, 2020      To Whom It May Concern:      Cat Lake was seen in our Emergency Department today, 01/04/20.  I expect her condition to improve over the next day.  She may return to work when improved.    Sincerely,        IRMA PinaN, RN, PHN, CHARMAINE, CPEN

## 2020-01-04 NOTE — ED PROVIDER NOTES
"History     Chief Complaint:  Sinusitis    HPI  Cat Lake is a 39 year old female with a history of chemical and alcohol dependence who presents to the emergency department today for evaluation of sinus pressure and headache. Over the last few day she has been experiencing sinus pressure, congestion and headache all of which have been progressively worsening. Yesterday she had a sore throat but this has resolved. The patient has been managing her pain with ibuprofen but today this did not provide good relief.       Allergies:  No known drug allergies    Medications:    The patient is not currently taking any prescribed medications.    Past Medical History:    Overdose  Lumbar contusion  Altered mental status  Chemical dependency  Alcohol abuse  Carpal tunnel syndrome  Self injury  Postpartum depression  Lumbar contusion  Methadone misuse  Methamphetamine abuse  MRSA  Opioid dependence     Past Surgical History:    Open reduction internal fixation hand right  Salpingectomy  Mouth surgery    Family History:    Anxiety Disorder   Bipolar Disorder   Depression  Substance Abuse     Social History:  The patient reports that she has been smoking. She has a 10.00 pack-year smoking history. She has never used smokeless tobacco. She reports current drug use. Drug: Methamphetamines. She reports that she does not drink alcohol.   PCP: [facility], [facility]  Marital Status: Single [1]      Review of Systems   HENT: Positive for congestion, sinus pressure and sinus pain. Negative for sore throat.    Neurological: Positive for headaches.   All other systems reviewed and are negative.       Physical Exam     Patient Vitals for the past 24 hrs:   BP Temp Temp src Pulse Heart Rate SpO2 Height Weight   01/04/20 1650 (!) 142/99 -- -- 90 -- 100 % -- --   01/04/20 1420 (!) 152/99 97.7  F (36.5  C) Temporal -- 96 99 % 1.575 m (5' 2\") 62.3 kg (137 lb 5.6 oz)     Physical Exam  Constitutional: well appearing, no acute distress. "   Head: No external signs of trauma noted to head or face.   Eyes: Pupils are equal, round, and reactive to light. Conjunctiva normal. EOMI.  ENT: external ears, canals, and TMs normal bilaterally. Frontal and maxillary sinus tenderness. Nasal mucosa is edematous. MMM. Oropharynx clear and moist without tonsillar enlargement or exudate noted. Normal voice.   Neck: no lymphadenopathy. Non-tender. Normal ROM without nuchal rigidity.   Cardiovascular: Normal rate, regular rhythm, and intact distal pulses.    Respiratory: Effort normal. No respiratory distress. Lungs clear to auscultation bilaterally.   GI: Soft. Non-tender.   Musculoskeletal: No deformities appreciated. Normal ROM. No edema noted.  Neurological: Alert and Oriented x 3. Speech normal. Moves all extremities equally. CN II-XII inact. Coordination normal. Normal strength and sensation of extremities. Gait normal.  Psychiatric: Appropriate mood, affect, and behavior.   Skin: Skin is warm and dry.       Emergency Department Course     Interventions:  1513 Zyprexa 2.5 mg IM  1522 NS 1L IV Bolus  1522 Toradol, 15 mg, IV  1522 Afrin 2 spray nasal    Emergency Department Course:  Past medical records, nursing notes, and vitals reviewed.  1449: I performed an exam of the patient and obtained history, as documented above.     1631: I rechecked the patient.  Findings and plan explained to the Patient. Patient discharged home with instructions regarding supportive care, medications, and reasons to return. The importance of close follow-up was reviewed.     Impression & Plan      Medical Decision Making:  Cat Lake is a 39 year old female who presents to the emergency department today for evaluation of headache and sinus congestion. She admits that her headache does feel typical of previous headaches and migraines, but that she has new sinus pain associated with it. She is afebrile and well appearing here with a normal neurologic exam. The patient has not had any  fever, weakness, numbness, paresthesia, neck stiffness, confusion, or other concerning red flags today. Meningitis, subarachnoid hemorrhage, CNS tumor, and stroke are considered as part of the differential, and considered unlikely. No indication for imaging at this time. The pain has improved with medication interventions. I suspect this her typical headache, but exacerbated by sinusitis. Given the severity of her symptoms, I will plan to treat with antibiotics. We also discussed decongestant use as well. The patient should follow-up with her primary physician within 2-3 days. Advised to take Ibuprofen/tylenol on a scheduled basis for the next 2 days. If the headache continues or the frequency increases, consultation with neurology may be indicated.  Return if increasing pain, fever, vomiting or weakness, or any other concerns.  Take prescribed medications as directed.      Diagnosis:    ICD-10-CM   1. Acute nonintractable headache, unspecified headache type R51   2. Sinusitis J32.9       Disposition:   discharged to home    Discharge Medications:     Medication List      Started    amoxicillin-clavulanate 875-125 MG tablet  Commonly known as:  AUGMENTIN  1 tablet, Oral, 2 TIMES DAILY            Scribe Disclosure:  I, Yue Dixon, am serving as a scribe at 2:49 PM on 1/4/2020 to document services personally performed by Rachel Lombardo PA-C based on my observations and the provider's statements to me.    [facility] EMERGENCY DEPARTMENT       Rachel Lombardo PA-C  01/05/20 1056

## 2020-01-23 ENCOUNTER — HOSPITAL ENCOUNTER (EMERGENCY)
Facility: CLINIC | Age: 40
Discharge: HOME | End: 2020-01-23
Attending: EMERGENCY MEDICINE | Admitting: EMERGENCY MEDICINE
Payer: COMMERCIAL

## 2020-01-23 VITALS
SYSTOLIC BLOOD PRESSURE: 143 MMHG | TEMPERATURE: 97.5 F | OXYGEN SATURATION: 97 % | RESPIRATION RATE: 16 BRPM | HEART RATE: 92 BPM | DIASTOLIC BLOOD PRESSURE: 97 MMHG

## 2020-01-23 DIAGNOSIS — Z04.41 ENCOUNTER FOR EXAMINATION AND OBSERVATION FOLLOWING ALLEGED ADULT RAPE: ICD-10-CM

## 2020-01-23 PROCEDURE — 25000128 H RX IP 250 OP 636: Performed by: EMERGENCY MEDICINE

## 2020-01-23 PROCEDURE — 96372 THER/PROPH/DIAG INJ SC/IM: CPT

## 2020-01-23 PROCEDURE — 25000125 ZZHC RX 250: Performed by: EMERGENCY MEDICINE

## 2020-01-23 PROCEDURE — 99285 EMERGENCY DEPT VISIT HI MDM: CPT | Mod: 25

## 2020-01-23 PROCEDURE — 25000132 ZZH RX MED GY IP 250 OP 250 PS 637: Performed by: EMERGENCY MEDICINE

## 2020-01-23 RX ORDER — AZITHROMYCIN 250 MG/1
1000 TABLET, FILM COATED ORAL ONCE
Status: COMPLETED | OUTPATIENT
Start: 2020-01-23 | End: 2020-01-23

## 2020-01-23 RX ORDER — METRONIDAZOLE 500 MG/1
2000 TABLET ORAL ONCE
Status: COMPLETED | OUTPATIENT
Start: 2020-01-23 | End: 2020-01-23

## 2020-01-23 RX ORDER — EMTRICITABINE AND TENOFOVIR DISOPROXIL FUMARATE 200; 300 MG/1; MG/1
1 TABLET, FILM COATED ORAL ONCE
Status: COMPLETED | OUTPATIENT
Start: 2020-01-23 | End: 2020-01-23

## 2020-01-23 RX ORDER — EMTRICITABINE AND TENOFOVIR DISOPROXIL FUMARATE 200; 300 MG/1; MG/1
1 TABLET, FILM COATED ORAL DAILY
Qty: 28 TABLET | Refills: 0 | Status: SHIPPED | OUTPATIENT
Start: 2020-01-23 | End: 2022-07-06

## 2020-01-23 RX ADMIN — AZITHROMYCIN MONOHYDRATE 1000 MG: 250 TABLET ORAL at 20:57

## 2020-01-23 RX ADMIN — LIDOCAINE HYDROCHLORIDE 250 MG: 10 INJECTION, SOLUTION EPIDURAL; INFILTRATION; INTRACAUDAL; PERINEURAL at 20:57

## 2020-01-23 RX ADMIN — METRONIDAZOLE 2000 MG: 500 TABLET ORAL at 20:57

## 2020-01-23 RX ADMIN — DOLUTEGRAVIR SODIUM 50 MG: 50 TABLET, FILM COATED ORAL at 20:57

## 2020-01-23 RX ADMIN — EMTRICITABINE AND TENOFOVIR DISOPROXIL FUMARATE 1 TABLET: 200; 300 TABLET, FILM COATED ORAL at 20:57

## 2020-01-23 NOTE — ED NOTES
Spoke with SHAH nurse from [facility] who stated a ESTELA nurse would be paged to our facility and would call when they have an ETA.
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
## 2020-01-23 NOTE — ED AVS SNAPSHOT
[facility] Emergency Department  [address]  [facility] [address]  Phone:  [phone]  Fax:  [phone]                                    Cat Lake   MRN: [id number]    Department:  [facility] Emergency Department   Date of Visit:  1/23/2020           After Visit Summary Signature Page    I have received my discharge instructions, and my questions have been answered. I have discussed any challenges I see with this plan with the nurse or doctor.    ..........................................................................................................................................  Patient/Patient Representative Signature      ..........................................................................................................................................  Patient Representative Print Name and Relationship to Patient    ..................................................               ................................................  Date                                   Time    ..........................................................................................................................................  Reviewed by Signature/Title    ...................................................              ..............................................  Date                                               Time          22EPIC Rev 08/18
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
## 2020-01-24 NOTE — ED NOTES
Pt has no complaints or requests at this time, awaiting prophylactic medications, then plan is discharge.
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
## 2020-03-24 ENCOUNTER — HOSPITAL ENCOUNTER (EMERGENCY)
Facility: CLINIC | Age: 40
Discharge: HOME | End: 2020-03-24
Attending: EMERGENCY MEDICINE | Admitting: EMERGENCY MEDICINE
Payer: MEDICAID

## 2020-03-24 VITALS
TEMPERATURE: 97.5 F | SYSTOLIC BLOOD PRESSURE: 160 MMHG | DIASTOLIC BLOOD PRESSURE: 100 MMHG | OXYGEN SATURATION: 100 % | HEART RATE: 86 BPM | RESPIRATION RATE: 20 BRPM

## 2020-03-24 DIAGNOSIS — F32.A DEPRESSION, UNSPECIFIED DEPRESSION TYPE: ICD-10-CM

## 2020-03-24 DIAGNOSIS — F15.10 AMPHETAMINE ABUSE (H): ICD-10-CM

## 2020-03-24 LAB
AMPHETAMINES UR QL SCN: POSITIVE
BARBITURATES UR QL: NEGATIVE
BENZODIAZ UR QL: NEGATIVE
CANNABINOIDS UR QL SCN: NEGATIVE
COCAINE UR QL: NEGATIVE
ETHANOL SERPL-MCNC: <0.01 G/DL
OPIATES UR QL SCN: NEGATIVE
PCP UR QL SCN: NEGATIVE

## 2020-03-24 PROCEDURE — 80307 DRUG TEST PRSMV CHEM ANLYZR: CPT | Performed by: EMERGENCY MEDICINE

## 2020-03-24 PROCEDURE — 80320 DRUG SCREEN QUANTALCOHOLS: CPT | Performed by: EMERGENCY MEDICINE

## 2020-03-24 PROCEDURE — 90791 PSYCH DIAGNOSTIC EVALUATION: CPT

## 2020-03-24 PROCEDURE — 99285 EMERGENCY DEPT VISIT HI MDM: CPT | Mod: 25

## 2020-03-24 PROCEDURE — 36415 COLL VENOUS BLD VENIPUNCTURE: CPT | Performed by: EMERGENCY MEDICINE

## 2020-03-24 NOTE — ED AVS SNAPSHOT
[facility] Emergency Department  [address]  [facility] [address]  Phone:  [phone]  Fax:  [phone]                                    Cat Lake   MRN: [id number]    Department:  [facility] Emergency Department   Date of Visit:  3/24/2020           After Visit Summary Signature Page    I have received my discharge instructions, and my questions have been answered. I have discussed any challenges I see with this plan with the nurse or doctor.    ..........................................................................................................................................  Patient/Patient Representative Signature      ..........................................................................................................................................  Patient Representative Print Name and Relationship to Patient    ..................................................               ................................................  Date                                   Time    ..........................................................................................................................................  Reviewed by Signature/Title    ...................................................              ..............................................  Date                                               Time          22EPIC Rev 08/18

## 2020-03-24 NOTE — ED TRIAGE NOTES
Patient presents with complaints of suicidal thoughts for the past week. Patient denies a plan at this time. ABC intact without need for intervention at this time.
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
## 2021-03-20 ENCOUNTER — HEALTH MAINTENANCE LETTER (OUTPATIENT)
Age: 41
End: 2021-03-20

## 2021-05-05 NOTE — ED NOTES
Patient called twice to be brought back to room. No response each time 1951 and 1959. Was was not seen in lobby as well.   
contact guard

## 2021-10-14 ENCOUNTER — TELEPHONE (OUTPATIENT)
Dept: BEHAVIORAL HEALTH | Facility: CLINIC | Age: 41
End: 2021-10-14

## 2021-10-14 NOTE — TELEPHONE ENCOUNTER
10/14/21 Received call from Pt requesting a Gambling Eval. Scheduled for 11/16/21 Video Visit. OBIE

## 2021-11-16 ENCOUNTER — HOSPITAL ENCOUNTER (OUTPATIENT)
Dept: BEHAVIORAL HEALTH | Facility: CLINIC | Age: 41
Discharge: HOME | End: 2021-11-16
Attending: FAMILY MEDICINE | Admitting: FAMILY MEDICINE
Payer: COMMERCIAL

## 2021-11-16 PROCEDURE — 90791 PSYCH DIAGNOSTIC EVALUATION: CPT | Mod: GT,95

## 2021-11-16 ASSESSMENT — ANXIETY QUESTIONNAIRES
GAD7 TOTAL SCORE: 6
8. IF YOU CHECKED OFF ANY PROBLEMS, HOW DIFFICULT HAVE THESE MADE IT FOR YOU TO DO YOUR WORK, TAKE CARE OF THINGS AT HOME, OR GET ALONG WITH OTHER PEOPLE?: SOMEWHAT DIFFICULT
6. BECOMING EASILY ANNOYED OR IRRITABLE: NOT AT ALL
7. FEELING AFRAID AS IF SOMETHING AWFUL MIGHT HAPPEN: SEVERAL DAYS
2. NOT BEING ABLE TO STOP OR CONTROL WORRYING: SEVERAL DAYS
GAD7 TOTAL SCORE: 6
3. WORRYING TOO MUCH ABOUT DIFFERENT THINGS: SEVERAL DAYS
7. FEELING AFRAID AS IF SOMETHING AWFUL MIGHT HAPPEN: SEVERAL DAYS
GAD7 TOTAL SCORE: 6
1. FEELING NERVOUS, ANXIOUS, OR ON EDGE: SEVERAL DAYS
4. TROUBLE RELAXING: SEVERAL DAYS
5. BEING SO RESTLESS THAT IT IS HARD TO SIT STILL: SEVERAL DAYS

## 2021-11-16 ASSESSMENT — PATIENT HEALTH QUESTIONNAIRE - PHQ9
SUM OF ALL RESPONSES TO PHQ QUESTIONS 1-9: 4
SUM OF ALL RESPONSES TO PHQ QUESTIONS 1-9: 4
10. IF YOU CHECKED OFF ANY PROBLEMS, HOW DIFFICULT HAVE THESE PROBLEMS MADE IT FOR YOU TO DO YOUR WORK, TAKE CARE OF THINGS AT HOME, OR GET ALONG WITH OTHER PEOPLE: SOMEWHAT DIFFICULT

## 2021-11-16 NOTE — TELEPHONE ENCOUNTER
Writer sent Releases of Information to PT via [employer] for:    [employer] - Problem Gambling Mike  Self  [facility] Probation  Pravin Sierra - Emergency Contact

## 2021-11-16 NOTE — TELEPHONE ENCOUNTER
Writer placed a call this morning to check in patient for virtual video appointment at 12:30pm. Unable to get a hold of patient, writer left a voicemail with writer's call back number. If patient does not respond, writer will try to call patient again.

## 2021-11-16 NOTE — PROGRESS NOTES
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 spirituality impact your recovery?    PT identified spirituality as important.    Please list any other problems, issues or concerns that could affect your recovery if not addressed?      Per PT \"Codependency.  I feel like I'm doing a good job identifying and working through these issues\".Codependency.     Dimension VI Ratings   Recovery environment - The placing authority must use the criteria in Dimension VI to determine a client s recovery environment.   RISK DESCRIPTIONS - Severity ratin Client is engaged in structured, meaningful activity and has a supportive significant other, family, and living environment.    REASONS SEVERITY WAS ASSIGNED - (What support does the person have for making changes? What structure/stability does the person have in his or her daily life that will increase the likelihood that changes can be sustained? What problems exist in the person s environment that will jeopardize getting/staying clean and sober?)     The patient reports living independently at the time of assessment.  Patient reports living environment is supportive of recovery efforts.  Patient denies she is currently in a significant " relationship. PT reported working with a sponsor in her recovery and attending 12-step support meetings in the community.  PT reports she is currently employed and appears to engage in meaningful activities outside of employment.  PT does report active legal involvement due to a theft, reporting she is currently following all conditions and recommendations of probation.           Collateral Contacts     Name:    Sonia Arguelles  [facility]   Relationship:       Phone Number:    [phone] Releases:    Yes     11/17/2021  Faxed ASHLEY to Probation.  11/17/2021  Probation reports PT has completed RAKAN treatment and continues to follow recommendations.  Probation reports felony theft charges and due to previous [facility] probation, PT scored a 6 on the SOGS and was recommended to completed a problem gambling evaluation.  Probation denies any current concerns with PT and history of gambling associated with substance use.          Summary of Gambling Disorder Symptoms     PT does not meet criteria for Gambling Disorder at this time.    Specify if:   Episodic:  Meeting diagnostic at more than one time point, with symptoms subsiding between periods of gambling disorder for at least several months.    Persistent:  Experiencing continuous symptoms, to meet diagnostic criteria for multiple years.    Specify if:   In early remission:  After full criteria for alcohol/drug use disorder were previously met, none of the criteria for alcohol/drug use disorder have been met for at least 3 months but for less than 12 months (with the exception that Criterion A4,  Craving or a strong desire or urge to use alcohol/drug  may be met).     In sustained remission:   After full criteria for alcohol use disorder were previously met, none of the criteria for alcohol/drug use disorder have been met at any time during a period of 12 months or longer (with the exception that Criterion A4,  Craving or strong desire or urge  to use alcohol/drug  may be met).   Specify if:   This additional specifier is used if the individual is in an environment where access to alcohol is restricted.    Mild: Presence of 4-5 symptoms    Moderate: Presence of 6-7 symptoms    Severe: Presence of 8 or more symptoms      Summary of Substance Abuse Disorder Symptoms     A problematic pattern of alcohol/drug use leading to clinically significant impairment or distress, as manifested by at least two of the following, occurring within a 12-month period:    PT reports a Substance Use Disorder history, reporting current sustained remission.    Specify if:   In early remission:  After full criteria for alcohol/drug use disorder were previously met, none of the criteria for alcohol/drug use disorder have been met for at least 3 months but for less than 12 months (with the exception that Criterion A4,  Craving or a strong desire or urge to use alcohol/drug  may be met).     In sustained remission:   After full criteria for alcohol use disorder were previously met, none of the criteria for alcohol/drug use disorder have been met at any time during a period of 12 months or longer (with the exception that Criterion A4,  Craving or strong desire or urge to use alcohol/drug  may be met).   Specify if:   This additional specifier is used if the individual is in an environment where access to alcohol is restricted.    Mild: Presence of 2-3 symptoms    Moderate: Presence of 4-5 symptoms    Severe: Presence of 6 or more symptoms    SOGS: 7 DSM-5: 0 CAGE-AID: 0 Current RAKAN sustained remission BRITTANI-7: 6 PHQ-9: 4       Vulnerable Adult Checklist for OUTPATIENTS     1.  Do you have a physical, emotional or mental infirmity or dysfunction?       No    2.  Does this issue impair your ability to provide for your own care without help, including providing yourself with food, shelter, clothing, healthcare or supervision?       No    3.  Because of this issue, I need assistance to  protect myself from maltreatment by others.      No    Based on the above information:    This person is not a functional Vulnerable Adult according to [place] Statute 626.5572 subdivision 21.      Category Severity (ICD-10 Code / DSM 5 Code)   Gambling Disorder NA     [place]-Suicide Severity Rating Scale   Suicide Ideation   1.) Have you ever wished you were dead or that you could go to sleep and not wake up?     Lifetime:  Yes - PT reports suicide attempt in 2012.  PT also reported active ideation without a plan in 2018. Past Month:  No   2.) Have you actually had any thoughts of killing yourself?   Lifetime:  Yes - PT reports an attempt in 2012. Past Month:  No   3.) Have you been thinking about how you might do this?     Lifetime:  Yes, Describe: PT reports taking pills in 2012.  PT reported medical intervention. Past Month:  No   4.) Have you had these thoughts and had some intention of acting on them?     Lifetime:  Yes, Describe: PT reports suicide attempt in 2012. Past Month:  No   5.) Have you started to work out the details of how to kill yourself?   Lifetime:  Yes, Describe: PT reports suicide attempt in 2012. Past Month:  No   6.) Do you intend to carry out this plan?      Lifetime:  Yes, Describe: PT reported suicide attempt in 2012. Past Month:  No   Intensity of Ideation   Intensity of ideation (1 being least severe, 5 being most severe):     Lifetime:  PT reported in 2018 about a 3.  PT denied having a plan a the time.  PT reported ideation in 2012 was a 5 due to attempt. Past Month:  PT denies any ideations in the past month.   How often do you have these thoughts?  PT denies any current suicidal ideations.    When you have the thoughts how long do they last?  N/A   Can you stop thinking about killing yourself or wanting to die if you want to?  N/A   Are there things - anyone or anything (i.e. family, Temple, pain of death) that stopped you from wanting to die or acting on thoughts of suicide?  PT reported seeking medical intervention in 2012 and 2018.  PT denied any other suicidal ideations, plans or attempts.   What sort of reasons did you have for thinking about wanting to die or killing yourself (ie end pain, stop how you were feeling, get attention or reaction, revenge)?  PT reported the suicide attempt in 2012 reporting it was during an argument with her then partner.   Suicidal Behavior   (Suicide Attempt) - Have you made a suicide attempt?     Lifetime:  Yes.  Total number of attempts:  1.  Date of most recent attempt: 2012. Past Month:  No   Have you engaged in self-harm (non-suicidal self-injury)?  Yes, Describe: PT reported cutting in 2012.  PT denied any self-injurious behaviors since that time.   (Interrupted Attempt) - Has there been a time when you started to do something to end your life but someone or something stopped you before you actually did anything?  No   (Aborted or Self-Interrupted Attempt) - Has there been a time when you started to do something to try to end your life but you stopped yourself before you actually did anything?  PT reported having a loaded needle of drugs in 2018 and did not use.     (Preparatory Acts of Behavior) - Have you taken any steps towards making suicide attempt or preparing to kill yourself (such as collecting pills, getting a gun, giving valuables away or writing a suicide note)?  No   Actual Lethality/Medical Damage:  PT reported medical attention in 2012 due to taking pills.      2008  [employer]  Used with permission by Carolyn Ibrahim, PhD.       Guide to C-SSRS Risk Ratings   NO IDEATION:  with no active thoughts IDEATION: with a wish to die. IDEATION: with active thoughts. Risk Ratings   If Yes No No 0 - Very Low Risk   If NA Yes No 1 - Low Risk   If NA Yes Yes 2 - Low/moderate risk   IDEATION: associated thoughts of methods without intent or plan INTENT: Intent to follow through on suicide PLAN: Plan to follow  "through on suicide Risk Ratings cont...   If Yes No No 3 - Moderate Risk   If Yes Yes No 4 - High Risk   If Yes Yes Yes 5 - High Risk   The patient's ADDITIONAL RISK FACTORS and lack of PROTECTIVE FACTORS may increase their overall suicide risk ratings.     Additional Risk Factors:    Someone close to the patient (family member/friend) completed a suicide     Significant history of having untreated or poorly treated mental health symptoms     Tendency to be socially isolated and/or cut off from the support of others     Significant history of trauma and/or abuse issues     History of impulsive or aggressive behaviors     Significant legal problems including being at risk of incarceration   Protective Factors:    Having people in his/her life that would prevent the patient from considering a suicide attempt (i.e. young children, spouse, parents, etc.)     An absence of mental health issues or stable and well treated mental health issues     A positive relationship with his/her clinical medical and/or mental health providers     Having easy access to supportive family members     Having a good community support network     Having cultural, Spiritism or spiritual beliefs that discourage suicide     Having restricted access to highly lethal means of suicide     Risk Status   0. - Very Low Risk:  Evaluation Counselors:  Document in Epic / SBAR to counselor \"Very Low Risk\".      Treatment Counselors:  Reassess upon admission as applicable, assess weekly in progress notes under Dimension 3 and summarize in Discharge / Treatment summary under Dimension 3.   Additional information to support suicide risk rating: There was no additional information to provide at this time.     Summary of [facility] Placement Criteria:   I.) Intoxication and Withdrawal: 0   II.) Biomedical:  0   III.) Emotional and Behavioral:  1   IV.) Readiness to Change:  0   V.) Relapse Potential: 0   VI.) Recovery Environmental: 0     Evaluation Summary and " "Plan     Based on the information provided by the patient and collateral contact, Patient does NOT meet criteria for Gambling Disorder at the time of evaluation.  If any future occurrence should occur regarding financial or gambling, it is recommended patient return to [facility] for an updated evaluation.    's Recommendation      Abstain from all forms of gambling, including \"free\" games and online/joellen games.    Refrain from entering all types of wanda establishments.    Abstain from alcohol and all mood-altering substances unless prescribed by a licensed medical provider.    Continue to meet with established mental health therapist and follow ongoing recommendations.    Attend recovery support groups such as Alcoholics Anonymous, Narcotics Anonymous, Gamblers Anonymous, other 12-step, cultural, spiritual, and/or other supportive community meetings on a weekly basis.    Check in weekly with a trusted individual whom will hold you accountable.    Remain law abiding and follow all conditions/recommendations of probation/the courts.      Strengths:  Family support  Stable Employment  Financially secure  Properly treated mental health concerns  Properly treated physical health concerns  Stable housing  Connection to social and/or recovery support  Intelligent  Functional communication skills      Lori Lomas, JORY, [facility]        "

## 2021-11-17 ASSESSMENT — ANXIETY QUESTIONNAIRES: GAD7 TOTAL SCORE: 6

## 2021-11-17 ASSESSMENT — PATIENT HEALTH QUESTIONNAIRE - PHQ9: SUM OF ALL RESPONSES TO PHQ QUESTIONS 1-9: 4

## 2021-11-19 NOTE — TELEPHONE ENCOUNTER
Writer contacted PT informing her of recommendations following problem gambling evaluation.    Writer left VM for probation regarding recommendations of problem gambling evaluation.

## 2021-12-20 ENCOUNTER — OFFICE VISIT (OUTPATIENT)
Dept: FAMILY MEDICINE | Facility: CLINIC | Age: 41
End: 2021-12-20
Payer: COMMERCIAL

## 2021-12-20 VITALS
WEIGHT: 135 LBS | OXYGEN SATURATION: 98 % | TEMPERATURE: 97.5 F | HEART RATE: 87 BPM | BODY MASS INDEX: 24.69 KG/M2 | DIASTOLIC BLOOD PRESSURE: 77 MMHG | SYSTOLIC BLOOD PRESSURE: 124 MMHG

## 2021-12-20 DIAGNOSIS — N63.20 LEFT BREAST LUMP: Primary | ICD-10-CM

## 2021-12-20 PROCEDURE — 99203 OFFICE O/P NEW LOW 30 MIN: CPT

## 2021-12-20 ASSESSMENT — ENCOUNTER SYMPTOMS
CARDIOVASCULAR NEGATIVE: 1
MUSCULOSKELETAL NEGATIVE: 1
RESPIRATORY NEGATIVE: 1
BREAST MASS: 1
NEUROLOGICAL NEGATIVE: 1
CONSTITUTIONAL NEGATIVE: 1

## 2021-12-20 NOTE — PATIENT INSTRUCTIONS
Alternate Tylenol and Ibuprofen for discomfort. Apply heat to the area. If you notice the return of the lump, please follow up for ultrasound.

## 2021-12-20 NOTE — PROGRESS NOTES
Assessment & Plan     Left breast lump    Alternate Tylenol and Ibuprofen for discomfort. Apply heat to the area. If you notice the return of the lump, please follow up for ultrasound.       Return in about 1 week (around 12/27/2021), or if symptoms worsen or fail to improve.    Subjective     Cat is a 41 year old female who presents to clinic today for the following health issues:  Chief Complaint   Patient presents with     Mass     pinaful lymph node on the upper chest above the left breast      Cat presents with lump on her upper left breast last night. She reports she had nausea last night but besides this, no symptoms. She reports it is painful to touch and mobile. She denies fever, redness, discharge, weight change. It is not currently present.           Review of Systems   Constitutional: Negative.    Respiratory: Negative.    Cardiovascular: Negative.    Breasts:  Positive for breast mass.   Musculoskeletal: Negative.    Neurological: Negative.            Objective    /77 (BP Location: Right arm, Patient Position: Chair, Cuff Size: Adult Regular)   Pulse 87   Temp 97.5  F (36.4  C) (Tympanic)   Wt 61.2 kg (135 lb)   SpO2 98%   BMI 24.69 kg/m    Physical Exam  Constitutional:       Appearance: Normal appearance.   HENT:      Head: Normocephalic and atraumatic.   Musculoskeletal:      Cervical back: Normal range of motion and neck supple.   Skin:     Comments: No palpable mass, no erythema, no discharge noted   Neurological:      General: No focal deficit present.      Mental Status: She is alert and oriented to person, place, and time.   Psychiatric:         Mood and Affect: Mood normal.         Behavior: Behavior normal.         Thought Content: Thought content normal.         Judgment: Judgment normal.              Elfego Campbell PA-C

## 2021-12-31 ENCOUNTER — HOSPITAL ENCOUNTER (EMERGENCY)
Facility: CLINIC | Age: 41
Discharge: HOME | End: 2021-12-31
Admitting: EMERGENCY MEDICINE
Payer: COMMERCIAL

## 2021-12-31 VITALS
TEMPERATURE: 98.4 F | HEART RATE: 79 BPM | RESPIRATION RATE: 20 BRPM | OXYGEN SATURATION: 99 % | SYSTOLIC BLOOD PRESSURE: 145 MMHG | DIASTOLIC BLOOD PRESSURE: 104 MMHG

## 2021-12-31 PROCEDURE — 999N000104 HC STATISTIC NO CHARGE

## 2021-12-31 PROCEDURE — 93005 ELECTROCARDIOGRAM TRACING: CPT

## 2022-01-01 NOTE — ED NOTES
"Pt stated \"Since my EKG was ok I don't think that I am going to die and I will see my regular doctor later.\" I explained to the patient that while her EKG was not concerning enough to prioritize her ahead of other patient's who had also been waiting to see a provider a full evaluation of her condition had not been performed and that we could not medically clear her condition without an evaluation by a provider and likely further diagnostic testing. The patient verbalized understanding and stated that she still wished to LWBS. I presented the patient with a refusal of MSE form which she signed in my presence after verbalizing understanding of the risks of LWBS including worsening of condition and death. The patient the departed the ED.   "

## 2022-01-01 NOTE — ED TRIAGE NOTES
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 ABCs intact GCS 15

## 2022-01-03 ENCOUNTER — OFFICE VISIT (OUTPATIENT)
Dept: FAMILY MEDICINE | Facility: CLINIC | Age: 42
End: 2022-01-03
Payer: COMMERCIAL

## 2022-01-03 VITALS
TEMPERATURE: 97.7 F | OXYGEN SATURATION: 98 % | SYSTOLIC BLOOD PRESSURE: 138 MMHG | BODY MASS INDEX: 24.87 KG/M2 | DIASTOLIC BLOOD PRESSURE: 87 MMHG | HEART RATE: 105 BPM | WEIGHT: 136 LBS

## 2022-01-03 DIAGNOSIS — R05.9 COUGH: Primary | ICD-10-CM

## 2022-01-03 DIAGNOSIS — H60.391 INFECTIVE OTITIS EXTERNA, RIGHT: ICD-10-CM

## 2022-01-03 DIAGNOSIS — Z20.822 SUSPECTED 2019 NOVEL CORONAVIRUS INFECTION: ICD-10-CM

## 2022-01-03 LAB
ATRIAL RATE - MUSE: 73 BPM
DIASTOLIC BLOOD PRESSURE - MUSE: NORMAL MMHG
INTERPRETATION ECG - MUSE: NORMAL
P AXIS - MUSE: 78 DEGREES
PR INTERVAL - MUSE: 138 MS
QRS DURATION - MUSE: 100 MS
QT - MUSE: 392 MS
QTC - MUSE: 431 MS
R AXIS - MUSE: 86 DEGREES
SYSTOLIC BLOOD PRESSURE - MUSE: NORMAL MMHG
T AXIS - MUSE: 60 DEGREES
VENTRICULAR RATE- MUSE: 73 BPM

## 2022-01-03 PROCEDURE — U0003 INFECTIOUS AGENT DETECTION BY NUCLEIC ACID (DNA OR RNA); SEVERE ACUTE RESPIRATORY SYNDROME CORONAVIRUS 2 (SARS-COV-2) (CORONAVIRUS DISEASE [COVID-19]), AMPLIFIED PROBE TECHNIQUE, MAKING USE OF HIGH THROUGHPUT TECHNOLOGIES AS DESCRIBED BY CMS-2020-01-R: HCPCS | Performed by: FAMILY MEDICINE

## 2022-01-03 PROCEDURE — 99213 OFFICE O/P EST LOW 20 MIN: CPT

## 2022-01-03 PROCEDURE — U0005 INFEC AGEN DETEC AMPLI PROBE: HCPCS | Performed by: FAMILY MEDICINE

## 2022-01-03 RX ORDER — NEOMYCIN SULFATE, POLYMYXIN B SULFATE, HYDROCORTISONE 3.5; 10000; 1 MG/ML; [USP'U]/ML; MG/ML
3 SOLUTION/ DROPS AURICULAR (OTIC) 3 TIMES DAILY
Qty: 10 ML | Refills: 0 | Status: SHIPPED | OUTPATIENT
Start: 2022-01-03 | End: 2022-01-08

## 2022-01-03 NOTE — PROGRESS NOTES
ASSESSMENT:    ICD-10-CM    1. Cough  R05.9 Symptomatic; Unknown COVID-19 Virus (Coronavirus) by PCR Nose   2. Suspected 2019 novel coronavirus infection  Z20.822    3. Infective otitis externa, right  H60.391 neomycin-polymyxin-hydrocortisone (CORTISPORIN) 3.5-17982-9 otic solution     Known exposure to positive case and symptoms consistent with COVID.  Vitals reassuring.  No evidence of respiratory compromise on exam.    PLAN:  Await results of COVID PCR.  Cortisporin for R ear x 5 days.    Patient Instructions   Continue to focus rest and fluid intake.    If rapidly worsening, go to the ER.    Results team will call you if COVID test is positive.  For now, assume you are positive and stay isolate for at least 5 days from the start of symptoms.    Recommended that patient get booster dose as soon as quarantine is done or sooner if PCR test is negative.    SUBJECTIVE:  Cat Lake is a 41 year old female who presents to [facility] today requesting a COVID test.  She wasn't able to find a site with openings today, she says.  Was exposed to someone last Thursday who tested positive.  Has runny nose and cough.    Has 2 doses of vaccine.  Not yet boostered.    R ear pain.  No drainage.     OBJECTIVE:  /87 (BP Location: Right arm, Patient Position: Chair, Cuff Size: Adult Large)   Pulse 105   Temp 97.7  F (36.5  C) (Tympanic)   Wt 61.7 kg (136 lb)   SpO2 98%   BMI 24.87 kg/m      GEN: well-appearing, in NAD  ENT: TMs normal, L canal WNL.  R canal with erythematous patch and possible early pustule formation on the floor of the canal.  Oral MMM.  Normal pharynx  Lungs: CTAB

## 2022-01-03 NOTE — PATIENT INSTRUCTIONS
Continue to focus rest and fluid intake.    If rapidly worsening, go to the ER.    Results team will call you if COVID test is positive.  For now, assume you are positive and stay isolate for at least 5 days from the start of symptoms.

## 2022-01-04 ENCOUNTER — MYC MEDICAL ADVICE (OUTPATIENT)
Dept: FAMILY MEDICINE | Facility: CLINIC | Age: 42
End: 2022-01-04
Payer: COMMERCIAL

## 2022-01-04 DIAGNOSIS — U07.1 SARS-COV-2 POSITIVE: Primary | ICD-10-CM

## 2022-01-04 LAB — SARS-COV-2 RNA RESP QL NAA+PROBE: POSITIVE

## 2022-04-10 ENCOUNTER — HEALTH MAINTENANCE LETTER (OUTPATIENT)
Age: 42
End: 2022-04-10

## 2022-04-11 ENCOUNTER — OFFICE VISIT (OUTPATIENT)
Dept: FAMILY MEDICINE | Facility: CLINIC | Age: 42
End: 2022-04-11
Payer: COMMERCIAL

## 2022-04-11 VITALS
BODY MASS INDEX: 24.69 KG/M2 | TEMPERATURE: 97.9 F | WEIGHT: 135 LBS | OXYGEN SATURATION: 98 % | SYSTOLIC BLOOD PRESSURE: 129 MMHG | HEART RATE: 78 BPM | DIASTOLIC BLOOD PRESSURE: 88 MMHG

## 2022-04-11 DIAGNOSIS — B96.89 BACTERIAL VAGINOSIS: ICD-10-CM

## 2022-04-11 DIAGNOSIS — N89.8 VAGINAL DISCHARGE: ICD-10-CM

## 2022-04-11 DIAGNOSIS — B37.31 CANDIDAL VULVOVAGINITIS: ICD-10-CM

## 2022-04-11 DIAGNOSIS — Z11.3 SCREEN FOR STD (SEXUALLY TRANSMITTED DISEASE): Primary | ICD-10-CM

## 2022-04-11 DIAGNOSIS — N76.0 BACTERIAL VAGINOSIS: ICD-10-CM

## 2022-04-11 LAB
ALBUMIN UR-MCNC: NEGATIVE MG/DL
APPEARANCE UR: ABNORMAL
BILIRUB UR QL STRIP: NEGATIVE
COLOR UR AUTO: YELLOW
GLUCOSE UR STRIP-MCNC: NEGATIVE MG/DL
HGB UR QL STRIP: NEGATIVE
KETONES UR STRIP-MCNC: NEGATIVE MG/DL
LEUKOCYTE ESTERASE UR QL STRIP: NEGATIVE
NITRATE UR QL: NEGATIVE
PH UR STRIP: 5.5 [PH] (ref 5–7)
RBC #/AREA URNS AUTO: NORMAL /HPF
SP GR UR STRIP: 1.01 (ref 1–1.03)
UROBILINOGEN UR STRIP-ACNC: 0.2 E.U./DL
WBC #/AREA URNS AUTO: NORMAL /HPF

## 2022-04-11 PROCEDURE — 96372 THER/PROPH/DIAG INJ SC/IM: CPT | Performed by: PHYSICIAN ASSISTANT

## 2022-04-11 PROCEDURE — 81001 URINALYSIS AUTO W/SCOPE: CPT | Performed by: PHYSICIAN ASSISTANT

## 2022-04-11 PROCEDURE — 87591 N.GONORRHOEAE DNA AMP PROB: CPT | Performed by: PHYSICIAN ASSISTANT

## 2022-04-11 PROCEDURE — 99214 OFFICE O/P EST MOD 30 MIN: CPT | Mod: 25

## 2022-04-11 PROCEDURE — 87491 CHLMYD TRACH DNA AMP PROBE: CPT | Performed by: PHYSICIAN ASSISTANT

## 2022-04-11 RX ORDER — FLUCONAZOLE 150 MG/1
150 TABLET ORAL ONCE
Qty: 1 TABLET | Refills: 1 | Status: SHIPPED | OUTPATIENT
Start: 2022-04-11 | End: 2022-04-11

## 2022-04-11 RX ORDER — AZITHROMYCIN 500 MG/1
500 TABLET, FILM COATED ORAL ONCE
Status: COMPLETED | OUTPATIENT
Start: 2022-04-11 | End: 2022-04-11

## 2022-04-11 RX ORDER — METRONIDAZOLE 7.5 MG/G
1 GEL VAGINAL DAILY
Qty: 35 G | Refills: 0 | Status: SHIPPED | OUTPATIENT
Start: 2022-04-11 | End: 2022-04-18

## 2022-04-11 RX ORDER — CEFTRIAXONE SODIUM 1 G
500 VIAL (EA) INJECTION ONCE
Status: COMPLETED | OUTPATIENT
Start: 2022-04-11 | End: 2022-04-11

## 2022-04-11 RX ORDER — FLUOXETINE 10 MG/1
10 CAPSULE ORAL
COMMUNITY
Start: 2022-02-03 | End: 2023-02-03

## 2022-04-11 RX ORDER — AMLODIPINE BESYLATE 5 MG/1
5 TABLET ORAL DAILY
COMMUNITY
Start: 2022-02-03 | End: 2023-04-17

## 2022-04-11 RX ADMIN — AZITHROMYCIN 500 MG: 500 TABLET, FILM COATED ORAL at 12:21

## 2022-04-11 RX ADMIN — Medication 500 MG: at 12:18

## 2022-04-11 ASSESSMENT — ENCOUNTER SYMPTOMS
ABDOMINAL PAIN: 1
CONSTITUTIONAL NEGATIVE: 1

## 2022-04-11 NOTE — PROGRESS NOTES
Assessment & Plan     Candidal vulvovaginitis  - fluconazole (DIFLUCAN) 150 MG tablet  Dispense: 1 tablet; Refill: 1    Bacterial vaginosis    Screen for STD (sexually transmitted disease)  - UA reflex to Microscopic and Culture  - NEISSERIA GONORRHOEA PCR  - CHLAMYDIA TRACHOMATIS PCR    Vaginal discharge  - metroNIDAZOLE (METROGEL) 0.75 % vaginal gel  Dispense: 35 g; Refill: 0     UA negative. Gonorrhea, Chlamydia pending. 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 Due to discharge and exposure, treated for STDs. Patient also reported history of yeast infections when on antibiotics and placed on Diflucan. She is to not have intercourse until results of STD.    Return in about 1 week (around 4/18/2022) for Follow up.    Usman Shelton is a 41 year old female who presents to clinic today for the following health issues:  Chief Complaint   Patient presents with     Vaginal Problem     For weeks, discharge, painful in the groin area.       STD     Cat presents with vaginal discharge and lower abdominal pain in groin area x several weeks. She states no burning, frequency or urgency. She reports some mild back pain. She has history of BV in the past. LMP: 4/3/22.           Review of Systems   Constitutional: Negative.    Gastrointestinal: Positive for abdominal pain.   Genitourinary: Positive for vaginal discharge and vaginal pain.           Objective    /88 (BP Location: Right arm, Patient Position: Sitting, Cuff Size: Adult Regular)   Pulse 78   Temp 97.9  F (36.6  C) (Oral)   Wt 61.2 kg (135 lb)   SpO2 98%   BMI 24.69 kg/m    Physical Exam  Constitutional:       Appearance: Normal appearance.   HENT:      Head: Normocephalic and atraumatic.   Abdominal:      Tenderness: There is no right CVA tenderness or left CVA tenderness.   Musculoskeletal:         General: Normal range of  motion.      Cervical back: Normal range of motion and neck supple.   Skin:     General: Skin is warm and dry.   Neurological:      General: No focal deficit present.      Mental Status: She is alert and oriented to person, place, and time.   Psychiatric:         Mood and Affect: Mood normal.         Behavior: Behavior normal.         Thought Content: Thought content normal.         Judgment: Judgment normal.              Elfego Campbell PA-C

## 2022-04-12 LAB
C TRACH DNA SPEC QL NAA+PROBE: NEGATIVE
N GONORRHOEA DNA SPEC QL NAA+PROBE: NEGATIVE

## 2022-07-06 ENCOUNTER — OFFICE VISIT (OUTPATIENT)
Dept: FAMILY MEDICINE | Facility: CLINIC | Age: 42
End: 2022-07-06
Payer: COMMERCIAL

## 2022-07-06 VITALS
DIASTOLIC BLOOD PRESSURE: 77 MMHG | BODY MASS INDEX: 24.69 KG/M2 | WEIGHT: 135 LBS | OXYGEN SATURATION: 98 % | SYSTOLIC BLOOD PRESSURE: 118 MMHG | HEART RATE: 64 BPM

## 2022-07-06 DIAGNOSIS — R42 DIZZINESS: Primary | ICD-10-CM

## 2022-07-06 PROCEDURE — 93000 ELECTROCARDIOGRAM COMPLETE: CPT

## 2022-07-06 PROCEDURE — 99213 OFFICE O/P EST LOW 20 MIN: CPT

## 2022-07-06 NOTE — PATIENT INSTRUCTIONS
EKG is normal today, the dizziness is most likely your anxiety.  Try ibuprofen for the pain in the upper back and shoulders. Over the counter ointments or creams should help as well. If it persists, see your primary care provider

## 2022-10-16 ENCOUNTER — HEALTH MAINTENANCE LETTER (OUTPATIENT)
Age: 42
End: 2022-10-16

## 2022-11-22 ENCOUNTER — OFFICE VISIT (OUTPATIENT)
Dept: URGENT CARE | Facility: URGENT CARE | Age: 42
End: 2022-11-22
Payer: COMMERCIAL

## 2022-11-22 ENCOUNTER — HOSPITAL ENCOUNTER (EMERGENCY)
Facility: CLINIC | Age: 42
Discharge: HOME | End: 2022-11-22
Admitting: EMERGENCY MEDICINE
Payer: COMMERCIAL

## 2022-11-22 VITALS
BODY MASS INDEX: 24.69 KG/M2 | DIASTOLIC BLOOD PRESSURE: 82 MMHG | TEMPERATURE: 98.3 F | RESPIRATION RATE: 18 BRPM | HEART RATE: 86 BPM | OXYGEN SATURATION: 98 % | WEIGHT: 135 LBS | SYSTOLIC BLOOD PRESSURE: 146 MMHG

## 2022-11-22 VITALS
SYSTOLIC BLOOD PRESSURE: 125 MMHG | HEART RATE: 56 BPM | OXYGEN SATURATION: 100 % | TEMPERATURE: 98.6 F | BODY MASS INDEX: 24.66 KG/M2 | HEIGHT: 62 IN | WEIGHT: 134 LBS | DIASTOLIC BLOOD PRESSURE: 84 MMHG | RESPIRATION RATE: 22 BRPM

## 2022-11-22 DIAGNOSIS — M79.622 PAIN OF LEFT UPPER ARM: ICD-10-CM

## 2022-11-22 DIAGNOSIS — R07.9 ACUTE CHEST PAIN: Primary | ICD-10-CM

## 2022-11-22 DIAGNOSIS — R06.02 SOB (SHORTNESS OF BREATH): ICD-10-CM

## 2022-11-22 DIAGNOSIS — I10 BENIGN ESSENTIAL HYPERTENSION: ICD-10-CM

## 2022-11-22 DIAGNOSIS — Z72.0 TOBACCO ABUSE: ICD-10-CM

## 2022-11-22 LAB
ALBUMIN SERPL-MCNC: 3.7 G/DL (ref 3.4–5)
ALP SERPL-CCNC: 41 U/L (ref 40–150)
ALT SERPL W P-5'-P-CCNC: 20 U/L (ref 0–50)
ANION GAP SERPL CALCULATED.3IONS-SCNC: 3 MMOL/L (ref 3–14)
AST SERPL W P-5'-P-CCNC: 15 U/L (ref 0–45)
ATRIAL RATE - MUSE: 62 BPM
BILIRUB SERPL-MCNC: 0.4 MG/DL (ref 0.2–1.3)
BUN SERPL-MCNC: 11 MG/DL (ref 7–30)
CALCIUM SERPL-MCNC: 8.9 MG/DL (ref 8.5–10.1)
CHLORIDE BLD-SCNC: 108 MMOL/L (ref 94–109)
CO2 SERPL-SCNC: 27 MMOL/L (ref 20–32)
CREAT SERPL-MCNC: 0.66 MG/DL (ref 0.52–1.04)
DIASTOLIC BLOOD PRESSURE - MUSE: NORMAL MMHG
ERYTHROCYTE [DISTWIDTH] IN BLOOD BY AUTOMATED COUNT: 14.7 % (ref 10–15)
GFR SERPL CREATININE-BSD FRML MDRD: >90 ML/MIN/1.73M2
GLUCOSE BLD-MCNC: 95 MG/DL (ref 70–99)
HCG SERPL QL: NEGATIVE
HCT VFR BLD AUTO: 36.3 % (ref 35–47)
HGB BLD-MCNC: 12.1 G/DL (ref 11.7–15.7)
HOLD SPECIMEN: NORMAL
INTERPRETATION ECG - MUSE: NORMAL
MCH RBC QN AUTO: 29.2 PG (ref 26.5–33)
MCHC RBC AUTO-ENTMCNC: 33.3 G/DL (ref 31.5–36.5)
MCV RBC AUTO: 88 FL (ref 78–100)
P AXIS - MUSE: 70 DEGREES
PLATELET # BLD AUTO: 196 10E3/UL (ref 150–450)
POTASSIUM BLD-SCNC: 3.7 MMOL/L (ref 3.4–5.3)
PR INTERVAL - MUSE: 128 MS
PROT SERPL-MCNC: 7 G/DL (ref 6.8–8.8)
QRS DURATION - MUSE: 100 MS
QT - MUSE: 418 MS
QTC - MUSE: 424 MS
R AXIS - MUSE: 84 DEGREES
RBC # BLD AUTO: 4.14 10E6/UL (ref 3.8–5.2)
SODIUM SERPL-SCNC: 138 MMOL/L (ref 133–144)
SYSTOLIC BLOOD PRESSURE - MUSE: NORMAL MMHG
T AXIS - MUSE: 62 DEGREES
TROPONIN I SERPL HS-MCNC: 4 NG/L
VENTRICULAR RATE- MUSE: 62 BPM
WBC # BLD AUTO: 7.8 10E3/UL (ref 4–11)

## 2022-11-22 PROCEDURE — 99214 OFFICE O/P EST MOD 30 MIN: CPT | Performed by: FAMILY MEDICINE

## 2022-11-22 PROCEDURE — 85027 COMPLETE CBC AUTOMATED: CPT | Performed by: EMERGENCY MEDICINE

## 2022-11-22 PROCEDURE — 93005 ELECTROCARDIOGRAM TRACING: CPT

## 2022-11-22 PROCEDURE — 84484 ASSAY OF TROPONIN QUANT: CPT | Performed by: EMERGENCY MEDICINE

## 2022-11-22 PROCEDURE — 36415 COLL VENOUS BLD VENIPUNCTURE: CPT | Performed by: EMERGENCY MEDICINE

## 2022-11-22 PROCEDURE — 80053 COMPREHEN METABOLIC PANEL: CPT | Performed by: EMERGENCY MEDICINE

## 2022-11-22 PROCEDURE — 93000 ELECTROCARDIOGRAM COMPLETE: CPT | Performed by: FAMILY MEDICINE

## 2022-11-22 PROCEDURE — 84703 CHORIONIC GONADOTROPIN ASSAY: CPT | Performed by: EMERGENCY MEDICINE

## 2022-11-22 PROCEDURE — 999N000104 HC STATISTIC NO CHARGE

## 2022-11-22 RX ORDER — ASPIRIN 325 MG
325 TABLET ORAL ONCE
Status: COMPLETED | OUTPATIENT
Start: 2022-11-22 | End: 2022-11-22

## 2022-11-22 RX ADMIN — Medication 325 MG: at 11:25

## 2022-11-22 NOTE — PROGRESS NOTES
SUBJECTIVE: Cat Lake is a 42 year old female presenting with a chief complaint of CP/SOB and left upper arm pain.  Onset of symptoms was this am ago.  Course of illness is worsening.    Current and Associated symptoms: none  Treatment measures tried include None tried.  Predisposing factors include tobacco use.    Past Medical History:   Diagnosis Date     Alcohol abuse      Carpal tunnel syndrome      Deliberate self-cutting      h/o Postpartum depression      Lumbar contusion      Methadone misuse      Methamphetamine abuse      MRSA (methicillin resistant Staphylococcus aureus) 8/4/2014    Nares     Opioid dependence      No Known Allergies  Social History     Tobacco Use     Smoking status: Every Day     Packs/day: 1.00     Years: 10.00     Pack years: 10.00     Types: Cigarettes     Smokeless tobacco: Never   Substance Use Topics     Alcohol use: No       ROS:  SKIN: no rash  GI: no vomiting    OBJECTIVE:  BP (!) 146/82   Pulse 86   Temp 98.3  F (36.8  C)   Resp 18   Wt 61.2 kg (135 lb)   SpO2 98%   BMI 24.69 kg/m  GENERAL APPEARANCE: healthy, alert and no distress  EYES: EOMI,  PERRL, conjunctiva clear  RESP: lungs clear to auscultation - no rales, rhonchi or wheezes  CV: regular rates and rhythm, normal S1 S2, no murmur noted  SKIN: no suspicious lesions or rashes  No left arm pain    EKG NSR without acute st changes      ICD-10-CM    1. Acute chest pain  R07.9 EKG 12-lead complete w/read - Clinics     aspirin (ASA) tablet 325 mg      2. SOB (shortness of breath)  R06.02 aspirin (ASA) tablet 325 mg      3. Tobacco abuse  Z72.0 aspirin (ASA) tablet 325 mg      4. Pain of left upper arm  M79.622 aspirin (ASA) tablet 325 mg      5. Benign essential hypertension  I10         Pt will go through ED for w/u and tx of CP with risk factors, declines Paramedic transport     100% of the time

## 2022-11-22 NOTE — ED TRIAGE NOTES
9am this morning began to feel fluttering in her chest and left sided chest pain and pain under left arm into axilla area. Patient has hx of HTN, takes losartan. Went to , had EKG and they sent her here, patient also received aspirin pta.     Triage Assessment     Row Name 11/22/22 1202       Triage Assessment (Adult)    Airway WDL WDL       Respiratory WDL    Respiratory WDL X  SOB that began this morning, worse with movement       Skin Circulation/Temperature WDL    Skin Circulation/Temperature WDL WDL       Cardiac WDL    Cardiac WDL X  fluttering in chest, left sided chest and left arm pain       Peripheral/Neurovascular WDL    Peripheral Neurovascular WDL WDL       Cognitive/Neuro/Behavioral WDL    Cognitive/Neuro/Behavioral WDL WDL

## 2022-11-22 NOTE — PROGRESS NOTES
Clinic Administered Medication Documentation    Administrations This Visit     aspirin (ASA) tablet 325 mg     Admin Date  11/22/2022 Action  Given Dose  325 mg Route  Oral Site   Administered By  Ned Man MA    Ordering Provider: Kiel Villarreal DO    NDC: 07386-766-20    Lot#: [id number]    : [email or web address]    Patient Supplied?: No    Comments: Pt chewed tablet per MD request.                Oral Medication Documentation    Patient was given Aspirin 325 mg (Pt chewed tablet per MD request). . Prior to medication administration, verified patients identity using patient s name and date of birth. Please see MAR and medication order for additional information.     Was entire amount of medication used? Yes  Expiration Date: 05/2023    BRIA Man, Medical Assistant

## 2023-04-17 ENCOUNTER — OFFICE VISIT (OUTPATIENT)
Dept: FAMILY MEDICINE | Facility: CLINIC | Age: 43
End: 2023-04-17
Payer: COMMERCIAL

## 2023-04-17 VITALS
OXYGEN SATURATION: 98 % | SYSTOLIC BLOOD PRESSURE: 126 MMHG | DIASTOLIC BLOOD PRESSURE: 83 MMHG | TEMPERATURE: 97.7 F | HEART RATE: 67 BPM

## 2023-04-17 DIAGNOSIS — K04.7 DENTAL INFECTION: Primary | ICD-10-CM

## 2023-04-17 DIAGNOSIS — Z86.19 HISTORY OF CANDIDIASIS OF VAGINA: ICD-10-CM

## 2023-04-17 PROCEDURE — 99213 OFFICE O/P EST LOW 20 MIN: CPT

## 2023-04-17 RX ORDER — LOSARTAN POTASSIUM 100 MG/1
100 TABLET ORAL DAILY
COMMUNITY
Start: 2022-12-01 | End: 2023-12-01

## 2023-04-17 RX ORDER — FLUCONAZOLE 150 MG/1
150 TABLET ORAL ONCE
Qty: 1 TABLET | Refills: 1 | Status: SHIPPED | OUTPATIENT
Start: 2023-04-17 | End: 2023-04-17

## 2023-04-17 ASSESSMENT — ENCOUNTER SYMPTOMS
CONSTITUTIONAL NEGATIVE: 1
CARDIOVASCULAR NEGATIVE: 1
RESPIRATORY NEGATIVE: 1

## 2023-04-17 NOTE — PATIENT INSTRUCTIONS
Take daily probiotic while on antibiotic and for 10 days after (ex. Culturelle), greek yogurt or activia    Gargle with hot salty water

## 2023-06-01 ENCOUNTER — HEALTH MAINTENANCE LETTER (OUTPATIENT)
Age: 43
End: 2023-06-01

## 2023-08-18 ENCOUNTER — OFFICE VISIT (OUTPATIENT)
Dept: URGENT CARE | Facility: URGENT CARE | Age: 43
End: 2023-08-18
Payer: COMMERCIAL

## 2023-08-18 VITALS
BODY MASS INDEX: 24.33 KG/M2 | DIASTOLIC BLOOD PRESSURE: 87 MMHG | RESPIRATION RATE: 18 BRPM | SYSTOLIC BLOOD PRESSURE: 128 MMHG | TEMPERATURE: 98 F | WEIGHT: 133 LBS | OXYGEN SATURATION: 100 % | HEART RATE: 64 BPM

## 2023-08-18 DIAGNOSIS — R35.0 URINARY FREQUENCY: ICD-10-CM

## 2023-08-18 DIAGNOSIS — R30.0 DYSURIA: Primary | ICD-10-CM

## 2023-08-18 LAB
ALBUMIN UR-MCNC: NEGATIVE MG/DL
APPEARANCE UR: CLEAR
BILIRUB UR QL STRIP: NEGATIVE
COLOR UR AUTO: YELLOW
GLUCOSE UR STRIP-MCNC: NEGATIVE MG/DL
HGB UR QL STRIP: NEGATIVE
KETONES UR STRIP-MCNC: NEGATIVE MG/DL
LEUKOCYTE ESTERASE UR QL STRIP: NEGATIVE
NITRATE UR QL: NEGATIVE
PH UR STRIP: 6 [PH] (ref 5–7)
SP GR UR STRIP: 1.01 (ref 1–1.03)
UROBILINOGEN UR STRIP-ACNC: 0.2 E.U./DL

## 2023-08-18 PROCEDURE — 99213 OFFICE O/P EST LOW 20 MIN: CPT | Performed by: FAMILY MEDICINE

## 2023-08-18 PROCEDURE — 81003 URINALYSIS AUTO W/O SCOPE: CPT

## 2023-08-18 PROCEDURE — 87086 URINE CULTURE/COLONY COUNT: CPT | Performed by: FAMILY MEDICINE

## 2023-08-18 RX ORDER — CEFDINIR 300 MG/1
300 CAPSULE ORAL 2 TIMES DAILY
Qty: 6 CAPSULE | Refills: 0 | Status: SHIPPED | OUTPATIENT
Start: 2023-08-18 | End: 2023-08-18

## 2023-08-18 NOTE — PROGRESS NOTES
SUBJECTIVE: Cat Lake is a 43 year old female who  presents today for a possible UTI.   Symptoms of urinary freq have been going on forday(s).    Past Medical History:   Diagnosis Date    Alcohol abuse     Carpal tunnel syndrome     Deliberate self-cutting     h/o Postpartum depression     Lumbar contusion     Methadone misuse     Methamphetamine abuse     MRSA (methicillin resistant Staphylococcus aureus) 8/4/2014    Nares    Opioid dependence      No Known Allergies  Social History     Tobacco Use    Smoking status: Every Day     Packs/day: 1.00     Years: 10.00     Pack years: 10.00     Types: Cigarettes    Smokeless tobacco: Never   Substance Use Topics    Alcohol use: No       ROS: CONSTITUTIONAL:NEGATIVE for fever, chills, change in weight    OBJECTIVE:  /87   Pulse 64   Temp 98  F (36.7  C)   Resp 18   Wt 60.3 kg (133 lb)   SpO2 100%   BMI 24.33 kg/m    NAD  No Flank/abd pain      ICD-10-CM    1. Dysuria  R30.0 UA Macroscopic with reflex to Microscopic and Culture - Lab Collect     Urine Culture Aerobic Bacterial - lab collect     Adult Urology  Referral      2. Urinary frequency  R35.0 cefdinir (OMNICEF) 300 MG capsule     Adult Urology  Referral        Drink plenty of fluids.  Prevention and treatment of UTI's discussed.Signs and symptoms of pyelonephritis mentioned.  Follow up with primary care physician if not improving

## 2023-08-21 LAB — BACTERIA UR CULT: NORMAL

## 2024-05-21 ENCOUNTER — OFFICE VISIT (OUTPATIENT)
Dept: FAMILY MEDICINE | Facility: CLINIC | Age: 44
End: 2024-05-21
Payer: COMMERCIAL

## 2024-05-21 VITALS
HEART RATE: 59 BPM | OXYGEN SATURATION: 100 % | SYSTOLIC BLOOD PRESSURE: 100 MMHG | TEMPERATURE: 98.2 F | RESPIRATION RATE: 16 BRPM | DIASTOLIC BLOOD PRESSURE: 70 MMHG

## 2024-05-21 DIAGNOSIS — M54.2 NECK PAIN: Primary | ICD-10-CM

## 2024-05-21 PROCEDURE — 99213 OFFICE O/P EST LOW 20 MIN: CPT

## 2024-05-21 RX ORDER — VARENICLINE TARTRATE 1 MG/1
TABLET, FILM COATED ORAL
COMMUNITY
Start: 2023-07-19 | End: 2024-08-11

## 2024-05-21 RX ORDER — POLYETHYLENE GLYCOL 3350 17 G
2 POWDER IN PACKET (EA) ORAL
COMMUNITY
Start: 2023-09-18 | End: 2024-08-11

## 2024-05-21 RX ORDER — NICOTINE 4 MG/1
1 INHALANT RESPIRATORY (INHALATION)
COMMUNITY
End: 2024-08-11

## 2024-05-21 RX ORDER — TOLTERODINE 2 MG/1
2 CAPSULE, EXTENDED RELEASE ORAL DAILY
COMMUNITY
Start: 2024-03-08 | End: 2024-08-11

## 2024-05-21 RX ORDER — MIRABEGRON 50 MG/1
50 TABLET, EXTENDED RELEASE ORAL DAILY
COMMUNITY
Start: 2024-05-06 | End: 2024-08-11

## 2024-05-21 RX ORDER — CYCLOBENZAPRINE HCL 5 MG
5 TABLET ORAL 3 TIMES DAILY PRN
Qty: 21 TABLET | Refills: 0 | Status: SHIPPED | OUTPATIENT
Start: 2024-05-21 | End: 2024-05-28

## 2024-05-21 RX ORDER — BUPROPION HYDROCHLORIDE 150 MG/1
150 TABLET, EXTENDED RELEASE ORAL
COMMUNITY
Start: 2024-05-06 | End: 2024-08-11

## 2024-05-21 NOTE — PROGRESS NOTES
Assessment & Plan     Neck pain    - cyclobenzaprine (FLEXERIL) 5 MG tablet  Dispense: 21 tablet; Refill: 0       Patient Instructions   Ibuprofen 3 200 mg tablets three times a day with food for another 2 - 3 days. Acetaminophen 500 mg tabs, two tabs three times a day as needed for several days. Muscle relaxer as directed do not drive or run machinery after taking.   Ice 20 min on every hour as able.     Follow up with dentist regarding tooth.  Follow up with primary care provideror urgent care if pain worsens, or numbness/tingling develop.     Return in about 1 week (around 5/28/2024), or if symptoms worsen or fail to improve.    [facility]    Usman Shelton is a 43 year old female who presents to clinic today for the following health issues:  Chief Complaint   Patient presents with     Urgent Care     Pt has been having neck pain that radiates into R side of jaw-possible dead tooth or slept wrong      HPI    Patient reports that she has pain on the right side of her neck that radiates to the right side of her jaw.  This started about 1 to 1-1/2 weeks ago the pain is always there but the intensity seems to come and go.  She is not sure if she slept  wrongly on her neck.  She also has seen been seen by 2 dentists for a right lower molar that may be infected.  She has had no fever sweats or chills.  No cold symptoms.  She has not had a fall or injury.  She has no numbness or tingling in her shoulders or arms.  She does have carpal tunnel and  has had no change in the numbness and tingling in her hands.  She has been taking three 200 mg tablets of ibuprofen about 3 times per day which seems to help.  She has no history of stomach kidney or liver problems.        Objective    /70   Pulse 59   Temp 98.2  F (36.8  C) (Tympanic)   Resp 16   SpO2 100%   Physical Exam  Vitals and nursing note reviewed.   Constitutional:        Appearance: Normal appearance. She is normal weight.   HENT:      Head: Normocephalic and atraumatic.      Right Ear: Tympanic membrane, ear canal and external ear normal.      Left Ear: Tympanic membrane, ear canal and external ear normal.      Nose: Nose normal.      Mouth/Throat:      Mouth: Mucous membranes are moist.      Pharynx: Oropharynx is clear.   Eyes:      Conjunctiva/sclera: Conjunctivae normal.      Pupils: Pupils are equal, round, and reactive to light.   Musculoskeletal:      Cervical back: Neck supple. No rigidity or tenderness.      Comments: Tenderness on light palpation of lower third of cervical spine, to the right side of cervical spine, and down to the upper thoracic area. Also tenderness along right side of jaw. No swelling, or erythema. Able to flex neck, pain with neck extension, and with ear to shoulder movement to right, and turning head to the right. No pain with range of motion of shoulder. Equal strength of shoulders, arms and hands.    Lymphadenopathy:      Cervical: Cervical adenopathy present.   Skin:     General: Skin is warm and dry.   Neurological:      General: No focal deficit present.      Mental Status: She is alert and oriented to person, place, and time.      Comments: No numbness with gross measurement of sensation of upper extremities.    Psychiatric:         Mood and Affect: Mood normal.         Behavior: Behavior normal.         Thought Content: Thought content normal.         Judgment: Judgment normal.

## 2024-05-21 NOTE — PATIENT INSTRUCTIONS
Ibuprofen 3 200 mg tablets three times a day with food for another 2 - 3 days. Acetaminophen 500 mg tabs, two tabs three times a day as needed for several days. Muscle relaxer as directed do not drive or run machinery after taking.   Ice 20 min on every hour as able.     Follow up with dentist regarding tooth.  Follow up with primary care provideror urgent care if pain worsens, or numbness/tingling develop.

## 2024-08-11 ENCOUNTER — OFFICE VISIT (OUTPATIENT)
Dept: URGENT CARE | Facility: URGENT CARE | Age: 44
End: 2024-08-11
Payer: COMMERCIAL

## 2024-08-11 ENCOUNTER — HOSPITAL ENCOUNTER (EMERGENCY)
Facility: CLINIC | Age: 44
End: 2024-08-11
Payer: COMMERCIAL

## 2024-08-11 VITALS
BODY MASS INDEX: 25.61 KG/M2 | RESPIRATION RATE: 18 BRPM | DIASTOLIC BLOOD PRESSURE: 77 MMHG | HEART RATE: 60 BPM | WEIGHT: 140 LBS | TEMPERATURE: 97.7 F | OXYGEN SATURATION: 100 % | SYSTOLIC BLOOD PRESSURE: 116 MMHG

## 2024-08-11 DIAGNOSIS — H53.8 BLURRY VISION, RIGHT EYE: Primary | ICD-10-CM

## 2024-08-11 PROCEDURE — 99214 OFFICE O/P EST MOD 30 MIN: CPT | Performed by: INTERNAL MEDICINE

## 2024-08-11 NOTE — PROGRESS NOTES
"SUBJECTIVE:  This 44 year old female presents with several days of eye pain that has been escalating on the right eye with some associated blurring of the vision on the right as well.  The vision disturbance is described as being \"like a curtain\" obscuring a portion of her vision.  Has a sense of pressure in the eye.  No redness, drainage or discharge. Denies known eye or head trauma.      OBJECTIVE:  /77   Pulse 60   Temp 97.7  F (36.5  C) (Tympanic)   Resp 18   Wt 63.5 kg (140 lb)   SpO2 100%   BMI 25.61 kg/m      GEN: alert and interactive  EYES: PERRL, EOMI; photophobia; direct ophthalmoscopic exam does not show obvious issue    ASSESSMENT/PLAN:    ICD-10-CM    1. Blurry vision, right eye  H53.8         Given acute eye pain and vision disturbance without obvious explanation, the patient will require a more thorough assessment with dilated retinal exam and assessment of intra-ocular pressure.  Retinal detachment is a possible diagnosis. We are unable to provide that level of care here.  She is referred to [facility].    Andrea Johns MD   "

## 2024-08-11 NOTE — PATIENT INSTRUCTIONS
Patient presents with two days of eye pressure, pain with movement and vision disturbance describing a sensation like a curtain over a portion of her vision.  Concern for possible retinal detachment and requires more detailed eye assessment than we can provide in Urgent Care. Referred to [facility].

## 2025-06-14 ENCOUNTER — HEALTH MAINTENANCE LETTER (OUTPATIENT)
Age: 45
End: 2025-06-14